# Patient Record
Sex: FEMALE | Race: WHITE | Employment: UNEMPLOYED | URBAN - METROPOLITAN AREA
[De-identification: names, ages, dates, MRNs, and addresses within clinical notes are randomized per-mention and may not be internally consistent; named-entity substitution may affect disease eponyms.]

---

## 2017-04-11 ENCOUNTER — OFFICE VISIT (OUTPATIENT)
Dept: FAMILY MEDICINE CLINIC | Age: 27
End: 2017-04-11

## 2017-04-11 VITALS
TEMPERATURE: 97.4 F | SYSTOLIC BLOOD PRESSURE: 136 MMHG | RESPIRATION RATE: 12 BRPM | HEART RATE: 72 BPM | HEIGHT: 71 IN | BODY MASS INDEX: 27.16 KG/M2 | WEIGHT: 194 LBS | DIASTOLIC BLOOD PRESSURE: 88 MMHG

## 2017-04-11 DIAGNOSIS — Z00.00 ROUTINE GENERAL MEDICAL EXAMINATION AT A HEALTH CARE FACILITY: ICD-10-CM

## 2017-04-11 DIAGNOSIS — F90.9 ATTENTION DEFICIT HYPERACTIVITY DISORDER (ADHD), UNSPECIFIED ADHD TYPE: ICD-10-CM

## 2017-04-11 DIAGNOSIS — Z23 NEED FOR TDAP VACCINATION: ICD-10-CM

## 2017-04-11 DIAGNOSIS — F41.9 ANXIETY: ICD-10-CM

## 2017-04-11 DIAGNOSIS — Z00.00 ROUTINE GENERAL MEDICAL EXAMINATION AT A HEALTH CARE FACILITY: Primary | ICD-10-CM

## 2017-04-11 DIAGNOSIS — F32.A DEPRESSION, UNSPECIFIED DEPRESSION TYPE: ICD-10-CM

## 2017-04-11 LAB
CHOLESTEROL, TOTAL: 230 MG/DL (ref 0–199)
HDLC SERPL-MCNC: 56 MG/DL (ref 40–59)
LDL CHOLESTEROL CALCULATED: 136 MG/DL (ref 0–129)
TRIGL SERPL-MCNC: 189 MG/DL (ref 0–200)

## 2017-04-11 PROCEDURE — 90471 IMMUNIZATION ADMIN: CPT | Performed by: FAMILY MEDICINE

## 2017-04-11 PROCEDURE — 90715 TDAP VACCINE 7 YRS/> IM: CPT | Performed by: FAMILY MEDICINE

## 2017-04-11 PROCEDURE — 99395 PREV VISIT EST AGE 18-39: CPT | Performed by: FAMILY MEDICINE

## 2017-04-11 RX ORDER — CLONAZEPAM 0.5 MG/1
0.5 TABLET ORAL 2 TIMES DAILY PRN
Qty: 60 TABLET | Refills: 2 | Status: SHIPPED | OUTPATIENT
Start: 2017-04-11 | End: 2017-06-27 | Stop reason: SDUPTHER

## 2017-04-11 ASSESSMENT — ENCOUNTER SYMPTOMS
DIARRHEA: 0
SORE THROAT: 0
EYE DISCHARGE: 0
COUGH: 0
CONSTIPATION: 0
SHORTNESS OF BREATH: 0
EYE ITCHING: 0
ABDOMINAL PAIN: 0
SINUS PRESSURE: 0

## 2017-06-27 ENCOUNTER — OFFICE VISIT (OUTPATIENT)
Dept: FAMILY MEDICINE CLINIC | Age: 27
End: 2017-06-27

## 2017-06-27 VITALS
SYSTOLIC BLOOD PRESSURE: 124 MMHG | HEART RATE: 72 BPM | BODY MASS INDEX: 26.23 KG/M2 | RESPIRATION RATE: 16 BRPM | WEIGHT: 187.4 LBS | HEIGHT: 71 IN | TEMPERATURE: 97.1 F | DIASTOLIC BLOOD PRESSURE: 88 MMHG

## 2017-06-27 DIAGNOSIS — F32.A DEPRESSION, UNSPECIFIED DEPRESSION TYPE: ICD-10-CM

## 2017-06-27 DIAGNOSIS — F41.9 ANXIETY: Primary | ICD-10-CM

## 2017-06-27 PROCEDURE — 99213 OFFICE O/P EST LOW 20 MIN: CPT | Performed by: FAMILY MEDICINE

## 2017-06-27 RX ORDER — SERTRALINE HYDROCHLORIDE 100 MG/1
100 TABLET, FILM COATED ORAL DAILY
Qty: 30 TABLET | Refills: 3 | Status: SHIPPED | OUTPATIENT
Start: 2017-06-27 | End: 2017-10-13 | Stop reason: SDUPTHER

## 2017-06-27 RX ORDER — CLONAZEPAM 0.5 MG/1
0.5 TABLET ORAL 3 TIMES DAILY PRN
Qty: 90 TABLET | Refills: 2 | Status: SHIPPED | OUTPATIENT
Start: 2017-06-27 | End: 2017-10-13 | Stop reason: SDUPTHER

## 2017-06-27 ASSESSMENT — ENCOUNTER SYMPTOMS
DIARRHEA: 0
SHORTNESS OF BREATH: 0
NAUSEA: 0
EYES NEGATIVE: 1
ABDOMINAL PAIN: 0
COUGH: 0
CONSTIPATION: 0

## 2017-10-13 ENCOUNTER — OFFICE VISIT (OUTPATIENT)
Dept: FAMILY MEDICINE CLINIC | Age: 27
End: 2017-10-13

## 2017-10-13 VITALS
RESPIRATION RATE: 16 BRPM | HEIGHT: 71 IN | SYSTOLIC BLOOD PRESSURE: 106 MMHG | WEIGHT: 190.2 LBS | TEMPERATURE: 96.8 F | DIASTOLIC BLOOD PRESSURE: 64 MMHG | HEART RATE: 72 BPM | BODY MASS INDEX: 26.63 KG/M2

## 2017-10-13 DIAGNOSIS — F90.9 ATTENTION DEFICIT HYPERACTIVITY DISORDER (ADHD), UNSPECIFIED ADHD TYPE: ICD-10-CM

## 2017-10-13 DIAGNOSIS — F41.9 ANXIETY: ICD-10-CM

## 2017-10-13 DIAGNOSIS — F32.A DEPRESSION, UNSPECIFIED DEPRESSION TYPE: Primary | ICD-10-CM

## 2017-10-13 PROCEDURE — 99213 OFFICE O/P EST LOW 20 MIN: CPT | Performed by: FAMILY MEDICINE

## 2017-10-13 RX ORDER — CLONAZEPAM 0.5 MG/1
0.5 TABLET ORAL 3 TIMES DAILY PRN
Qty: 90 TABLET | Refills: 4 | Status: SHIPPED | OUTPATIENT
Start: 2017-10-13 | End: 2018-03-02 | Stop reason: SDUPTHER

## 2017-10-13 RX ORDER — SERTRALINE HYDROCHLORIDE 100 MG/1
100 TABLET, FILM COATED ORAL DAILY
Qty: 30 TABLET | Refills: 5 | Status: SHIPPED | OUTPATIENT
Start: 2017-10-13 | End: 2018-03-02 | Stop reason: SDUPTHER

## 2017-10-13 ASSESSMENT — ENCOUNTER SYMPTOMS
ABDOMINAL PAIN: 0
NAUSEA: 0
COUGH: 0
DIARRHEA: 0
EYES NEGATIVE: 1
CONSTIPATION: 0
SHORTNESS OF BREATH: 0

## 2017-10-13 NOTE — PROGRESS NOTES
Illness Mother      bipolar / anxiety     No Known Allergies  Current Outpatient Prescriptions   Medication Sig Dispense Refill    clonazePAM (KLONOPIN) 0.5 MG tablet Take 1 tablet by mouth 3 times daily as needed for Anxiety 90 tablet 4    sertraline (ZOLOFT) 100 MG tablet Take 1 tablet by mouth daily 30 tablet 5     No current facility-administered medications for this visit. PMH, Surgical Hx, Family Hx, and Social Hx reviewed and updated. Health Maintenance reviewed. Objective    Vitals:    10/13/17 0935   BP: 106/64   Pulse: 72   Resp: 16   Temp: 96.8 °F (36 °C)   TempSrc: Temporal   Weight: 190 lb 3.2 oz (86.3 kg)   Height: 5' 10.5\" (1.791 m)       Physical Exam   HENT:   Nose: Nose normal.   Mouth/Throat: Oropharynx is clear and moist.   Eyes: Conjunctivae are normal. Pupils are equal, round, and reactive to light. Neck: Neck supple. No thyromegaly present. Cardiovascular: Normal rate, regular rhythm and normal heart sounds. No murmur heard. Pulmonary/Chest: Breath sounds normal. She has no wheezes. Abdominal: Soft. She exhibits no mass. There is no tenderness. Musculoskeletal: She exhibits no edema. Lymphadenopathy:     She has no cervical adenopathy. Neurological: She is alert. Psychiatric: She has a normal mood and affect. Her behavior is normal.       Assessment & Plan   1. Depression, unspecified depression type  sertraline (ZOLOFT) 100 MG tablet   2. Anxiety  clonazePAM (KLONOPIN) 0.5 MG tablet    sertraline (ZOLOFT) 100 MG tablet   3. Attention deficit hyperactivity disorder (ADHD), unspecified ADHD type       No orders of the defined types were placed in this encounter.     Orders Placed This Encounter   Medications    clonazePAM (KLONOPIN) 0.5 MG tablet     Sig: Take 1 tablet by mouth 3 times daily as needed for Anxiety     Dispense:  90 tablet     Refill:  4    sertraline (ZOLOFT) 100 MG tablet     Sig: Take 1 tablet by mouth daily     Dispense:  30 tablet     Refill:  5

## 2017-10-13 NOTE — PATIENT INSTRUCTIONS

## 2018-03-02 ENCOUNTER — OFFICE VISIT (OUTPATIENT)
Dept: FAMILY MEDICINE CLINIC | Age: 28
End: 2018-03-02
Payer: COMMERCIAL

## 2018-03-02 VITALS
WEIGHT: 203.4 LBS | HEART RATE: 98 BPM | SYSTOLIC BLOOD PRESSURE: 128 MMHG | RESPIRATION RATE: 20 BRPM | DIASTOLIC BLOOD PRESSURE: 76 MMHG | BODY MASS INDEX: 28.48 KG/M2 | TEMPERATURE: 98.7 F | HEIGHT: 71 IN

## 2018-03-02 DIAGNOSIS — F41.9 ANXIETY: ICD-10-CM

## 2018-03-02 DIAGNOSIS — F32.A DEPRESSION, UNSPECIFIED DEPRESSION TYPE: ICD-10-CM

## 2018-03-02 PROCEDURE — 99213 OFFICE O/P EST LOW 20 MIN: CPT | Performed by: FAMILY MEDICINE

## 2018-03-02 RX ORDER — SERTRALINE HYDROCHLORIDE 100 MG/1
100 TABLET, FILM COATED ORAL DAILY
Qty: 30 TABLET | Refills: 5 | Status: SHIPPED | OUTPATIENT
Start: 2018-03-02 | End: 2018-08-21 | Stop reason: ALTCHOICE

## 2018-03-02 RX ORDER — BUPROPION HYDROCHLORIDE 150 MG/1
150 TABLET ORAL EVERY MORNING
Qty: 30 TABLET | Refills: 3 | Status: SHIPPED | OUTPATIENT
Start: 2018-03-02 | End: 2018-04-03 | Stop reason: SDUPTHER

## 2018-03-02 RX ORDER — CLONAZEPAM 0.5 MG/1
TABLET ORAL
Qty: 120 TABLET | Refills: 0 | Status: SHIPPED | OUTPATIENT
Start: 2018-03-02 | End: 2018-08-21 | Stop reason: ALTCHOICE

## 2018-03-02 ASSESSMENT — ENCOUNTER SYMPTOMS
DIARRHEA: 0
NAUSEA: 0
CONSTIPATION: 0
EYES NEGATIVE: 1
SHORTNESS OF BREATH: 0
ABDOMINAL PAIN: 0
COUGH: 0

## 2018-03-02 ASSESSMENT — PATIENT HEALTH QUESTIONNAIRE - PHQ9
1. LITTLE INTEREST OR PLEASURE IN DOING THINGS: 0
SUM OF ALL RESPONSES TO PHQ QUESTIONS 1-9: 0
SUM OF ALL RESPONSES TO PHQ9 QUESTIONS 1 & 2: 0
2. FEELING DOWN, DEPRESSED OR HOPELESS: 0

## 2018-03-02 NOTE — PROGRESS NOTES
Subjective  Rice Fossa, 32 y.o. female presents today with:  Chief Complaint   Patient presents with    Anxiety         Depression                HPI  patient is present today to follow up on Klonopin 0.5 mg. 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 follow up on Zoloft 100 mg. this is working well for her. Taking current medications which were reviewed. Problem list discussed. Eating okay. Overall doing well. Has no other new problem / question. Health Maintenance Is Up-To-Date    No other questions and or concerns for today's visit      Review of Systems   Constitutional: Negative for activity change, appetite change, fatigue and fever. Eyes: Negative. Respiratory: Negative for cough and shortness of breath. Cardiovascular: Negative for chest pain and palpitations. Gastrointestinal: Negative for abdominal pain, constipation, diarrhea and nausea. Genitourinary: Negative for dysuria and frequency. Neurological: Negative for dizziness and headaches. Psychiatric/Behavioral: Negative for self-injury and suicidal ideas. History reviewed. No pertinent past medical history. History reviewed. No pertinent surgical history.   Social History     Social History    Marital status:      Spouse name: Janes Ortega    Number of children: 3    Years of education: N/A     Occupational History     Other     Social History Main Topics    Smoking status: Current Every Day Smoker     Packs/day: 0.25     Years: 8.00     Types: Cigarettes     Start date: 1/1/2009    Smokeless tobacco: Never Used      Comment: 1 pack per week    Alcohol use 0.6 oz/week     1 Glasses of wine per week    Drug use: No    Sexual activity: Yes     Partners: Male     Other Topics Concern    Not on file     Social History Narrative    No narrative on file     Family History   Problem Relation Age of Onset    Mental Refill:  5    buPROPion (WELLBUTRIN XL) 150 MG extended release tablet     Sig: Take 1 tablet by mouth every morning     Dispense:  30 tablet     Refill:  3     Medications Discontinued During This Encounter   Medication Reason    clonazePAM (KLONOPIN) 0.5 MG tablet Reorder    sertraline (ZOLOFT) 100 MG tablet Reorder     No Follow-up on file. Long talk. Benjalba Lies Options reviewed including seeing a therapist.  He does not feel he has the time do that  now  The importance of a good diet and exercise regimen discussed. Take medications as prescribed. We'll increase Klonopin just for this month. She'll try to exercise a little more and eat healthy and get enough sleep  Follow up as instructed. Call if any problems        Controlled Substances Monitoring: The Prescription Monitoring Report for this patient was reviewed today. Hellen Jaime MD)        Hellen Jaime MD          Please note this report has been partially produced using speech recognition software  And may cause contain errors related to that system including grammar, punctuation and spelling as well as words and phrases that may seem inappropriate. If there are questions or concerns please feel free to contact me to clarify.

## 2018-04-03 ENCOUNTER — OFFICE VISIT (OUTPATIENT)
Dept: FAMILY MEDICINE CLINIC | Age: 28
End: 2018-04-03
Payer: COMMERCIAL

## 2018-04-03 VITALS
SYSTOLIC BLOOD PRESSURE: 120 MMHG | DIASTOLIC BLOOD PRESSURE: 78 MMHG | RESPIRATION RATE: 12 BRPM | HEIGHT: 71 IN | BODY MASS INDEX: 28.42 KG/M2 | WEIGHT: 203 LBS | HEART RATE: 60 BPM | TEMPERATURE: 96.7 F

## 2018-04-03 DIAGNOSIS — F32.A DEPRESSION, UNSPECIFIED DEPRESSION TYPE: ICD-10-CM

## 2018-04-03 DIAGNOSIS — F41.9 ANXIETY: Primary | ICD-10-CM

## 2018-04-03 DIAGNOSIS — F90.9 ATTENTION DEFICIT HYPERACTIVITY DISORDER (ADHD), UNSPECIFIED ADHD TYPE: ICD-10-CM

## 2018-04-03 PROCEDURE — G8419 CALC BMI OUT NRM PARAM NOF/U: HCPCS | Performed by: FAMILY MEDICINE

## 2018-04-03 PROCEDURE — G8427 DOCREV CUR MEDS BY ELIG CLIN: HCPCS | Performed by: FAMILY MEDICINE

## 2018-04-03 PROCEDURE — 99213 OFFICE O/P EST LOW 20 MIN: CPT | Performed by: FAMILY MEDICINE

## 2018-04-03 PROCEDURE — 4004F PT TOBACCO SCREEN RCVD TLK: CPT | Performed by: FAMILY MEDICINE

## 2018-04-03 RX ORDER — CLONAZEPAM 0.5 MG/1
0.5 TABLET ORAL 3 TIMES DAILY PRN
Qty: 90 TABLET | Refills: 2 | Status: SHIPPED | OUTPATIENT
Start: 2018-04-03 | End: 2018-08-21 | Stop reason: ALTCHOICE

## 2018-04-03 RX ORDER — BUPROPION HYDROCHLORIDE 150 MG/1
150 TABLET ORAL EVERY MORNING
Qty: 30 TABLET | Refills: 4 | Status: SHIPPED | OUTPATIENT
Start: 2018-04-03 | End: 2018-08-21 | Stop reason: ALTCHOICE

## 2018-04-03 RX ORDER — CLONAZEPAM 0.5 MG/1
0.5 TABLET ORAL 3 TIMES DAILY PRN
Qty: 90 TABLET | Refills: 2 | Status: SHIPPED | OUTPATIENT
Start: 2018-04-03 | End: 2018-04-03 | Stop reason: SDUPTHER

## 2018-04-03 ASSESSMENT — ENCOUNTER SYMPTOMS
EYES NEGATIVE: 1
NAUSEA: 0
DIARRHEA: 0
ABDOMINAL PAIN: 0
CONSTIPATION: 0
SHORTNESS OF BREATH: 0
COUGH: 0

## 2018-08-21 ENCOUNTER — OFFICE VISIT (OUTPATIENT)
Dept: FAMILY MEDICINE CLINIC | Age: 28
End: 2018-08-21
Payer: COMMERCIAL

## 2018-08-21 VITALS
HEIGHT: 71 IN | HEART RATE: 70 BPM | BODY MASS INDEX: 27.5 KG/M2 | TEMPERATURE: 98 F | SYSTOLIC BLOOD PRESSURE: 116 MMHG | WEIGHT: 196.4 LBS | RESPIRATION RATE: 20 BRPM | DIASTOLIC BLOOD PRESSURE: 68 MMHG

## 2018-08-21 DIAGNOSIS — L98.9 SKIN LESION OF LEFT LEG: Primary | ICD-10-CM

## 2018-08-21 DIAGNOSIS — L98.9 SKIN LESION OF FACE: ICD-10-CM

## 2018-08-21 PROCEDURE — 4004F PT TOBACCO SCREEN RCVD TLK: CPT | Performed by: FAMILY MEDICINE

## 2018-08-21 PROCEDURE — G8427 DOCREV CUR MEDS BY ELIG CLIN: HCPCS | Performed by: FAMILY MEDICINE

## 2018-08-21 PROCEDURE — 99213 OFFICE O/P EST LOW 20 MIN: CPT | Performed by: FAMILY MEDICINE

## 2018-08-21 PROCEDURE — G8419 CALC BMI OUT NRM PARAM NOF/U: HCPCS | Performed by: FAMILY MEDICINE

## 2018-08-21 RX ORDER — SULFAMETHOXAZOLE AND TRIMETHOPRIM 800; 160 MG/1; MG/1
1 TABLET ORAL 2 TIMES DAILY
Qty: 20 TABLET | Refills: 0 | Status: SHIPPED | OUTPATIENT
Start: 2018-08-21 | End: 2018-08-31

## 2018-08-21 ASSESSMENT — ENCOUNTER SYMPTOMS
SORE THROAT: 1
NAUSEA: 0
EYES NEGATIVE: 1
DIARRHEA: 0
CONSTIPATION: 0
ABDOMINAL PAIN: 0
COUGH: 0
SHORTNESS OF BREATH: 0

## 2018-08-21 NOTE — PROGRESS NOTES
pulses. Exam reveals no gallop and no friction rub. No murmur heard. Pulmonary/Chest: Effort normal and breath sounds normal.   Abdominal: Soft. Bowel sounds are normal.   Musculoskeletal: Normal range of motion. Neurological: She is alert and oriented to person, place, and time. Skin: Skin is warm and dry. Psychiatric: She has a normal mood and affect. Her behavior is normal.       A. recurrent lesions are dry and crusting now there is no way to get a culture denies fever or chills her son has similar appearing lesions she's taking him to the pediatrician this afternoon we'll start her on Bactrim DS twice a day for 10 days if she is not improving over the next 2-3 days or seems to be worsening or new symptoms develop she will let us know. Plan    Diagnosis Orders   1. Skin lesion of left leg     2. Skin lesion of face       No orders of the defined types were placed in this encounter. Orders Placed This Encounter   Medications    sulfamethoxazole-trimethoprim (BACTRIM DS) 800-160 MG per tablet     Sig: Take 1 tablet by mouth 2 times daily for 10 days     Dispense:  20 tablet     Refill:  0     Medications Discontinued During This Encounter   Medication Reason    clonazePAM (KLONOPIN) 0.5 MG tablet Therapy completed    buPROPion (WELLBUTRIN XL) 150 MG extended release tablet Therapy completed    clonazePAM (KLONOPIN) 0.5 MG tablet Therapy completed    sertraline (ZOLOFT) 100 MG tablet Therapy completed     No Follow-up on file. Controlled Substances Monitoring:     RX Monitoring 3/3/2018   Attestation The Prescription Monitoring Report for this patient was reviewed today. Documentation No signs of potential drug abuse or diversion identified. ;Possible medication side effects, risk of tolerance/dependence & alternative treatments discussed.        Norbert Lundberg MD

## 2018-09-21 ENCOUNTER — OFFICE VISIT (OUTPATIENT)
Dept: FAMILY MEDICINE CLINIC | Age: 28
End: 2018-09-21
Payer: COMMERCIAL

## 2018-09-21 VITALS
DIASTOLIC BLOOD PRESSURE: 80 MMHG | TEMPERATURE: 97.6 F | HEIGHT: 71 IN | WEIGHT: 201.4 LBS | BODY MASS INDEX: 28.19 KG/M2 | RESPIRATION RATE: 16 BRPM | HEART RATE: 88 BPM | SYSTOLIC BLOOD PRESSURE: 122 MMHG

## 2018-09-21 DIAGNOSIS — F41.9 ANXIETY: Primary | ICD-10-CM

## 2018-09-21 DIAGNOSIS — F32.A DEPRESSION, UNSPECIFIED DEPRESSION TYPE: ICD-10-CM

## 2018-09-21 PROCEDURE — G8419 CALC BMI OUT NRM PARAM NOF/U: HCPCS | Performed by: FAMILY MEDICINE

## 2018-09-21 PROCEDURE — G8427 DOCREV CUR MEDS BY ELIG CLIN: HCPCS | Performed by: FAMILY MEDICINE

## 2018-09-21 PROCEDURE — 99213 OFFICE O/P EST LOW 20 MIN: CPT | Performed by: FAMILY MEDICINE

## 2018-09-21 PROCEDURE — 4004F PT TOBACCO SCREEN RCVD TLK: CPT | Performed by: FAMILY MEDICINE

## 2018-09-21 RX ORDER — BUPROPION HYDROCHLORIDE 150 MG/1
150 TABLET ORAL EVERY MORNING
Qty: 30 TABLET | Refills: 3 | Status: SHIPPED | OUTPATIENT
Start: 2018-09-21 | End: 2019-01-16 | Stop reason: ALTCHOICE

## 2018-09-21 RX ORDER — SERTRALINE HYDROCHLORIDE 100 MG/1
TABLET, FILM COATED ORAL
Qty: 30 TABLET | Refills: 5 | Status: SHIPPED | OUTPATIENT
Start: 2018-09-21 | End: 2019-01-07 | Stop reason: SDUPTHER

## 2018-09-21 RX ORDER — CLONAZEPAM 0.5 MG/1
0.5 TABLET ORAL 2 TIMES DAILY PRN
Qty: 50 TABLET | Refills: 1 | Status: SHIPPED | OUTPATIENT
Start: 2018-09-21 | End: 2018-11-16 | Stop reason: SDUPTHER

## 2018-09-21 ASSESSMENT — ENCOUNTER SYMPTOMS
ABDOMINAL PAIN: 0
COUGH: 0
SHORTNESS OF BREATH: 0
NAUSEA: 0
DIARRHEA: 0
CONSTIPATION: 0
EYES NEGATIVE: 1

## 2018-09-21 NOTE — PROGRESS NOTES
Family History   Problem Relation Age of Onset    Mental Illness Mother         bipolar / anxiety     No Known Allergies  Current Outpatient Prescriptions   Medication Sig Dispense Refill    clonazePAM (KLONOPIN) 0.5 MG tablet Take 1 tablet by mouth 2 times daily as needed for Anxiety for up to 30 days. . 50 tablet 1    buPROPion (WELLBUTRIN XL) 150 MG extended release tablet Take 1 tablet by mouth every morning 30 tablet 3    sertraline (ZOLOFT) 100 MG tablet 1/2 -1 PO DAILY 30 tablet 5     No current facility-administered medications for this visit. PMH, Surgical Hx, Family Hx, and Social Hx reviewed and updated. Health Maintenance reviewed. Objective    Vitals:    09/21/18 1312   BP: 122/80   Pulse: 88   Resp: 16   Temp: 97.6 °F (36.4 °C)   TempSrc: Temporal   Weight: 201 lb 6.4 oz (91.4 kg)   Height: 5' 10.5\" (1.791 m)       Physical Exam   HENT:   Nose: Nose normal.   Mouth/Throat: Oropharynx is clear and moist.   Eyes: Pupils are equal, round, and reactive to light. Conjunctivae are normal.   Neck: Neck supple. No thyromegaly present. Cardiovascular: Normal rate, regular rhythm and normal heart sounds. No murmur heard. Pulmonary/Chest: Breath sounds normal. She has no wheezes. Abdominal: Soft. She exhibits no mass. There is no tenderness. Musculoskeletal: She exhibits no edema. Lymphadenopathy:     She has no cervical adenopathy. Neurological: She is alert. Psychiatric: She has a normal mood and affect. Her behavior is normal.       Assessment & Plan    Diagnosis Orders   1. Anxiety  clonazePAM (KLONOPIN) 0.5 MG tablet    buPROPion (WELLBUTRIN XL) 150 MG extended release tablet    sertraline (ZOLOFT) 100 MG tablet   2. Depression, unspecified depression type  buPROPion (WELLBUTRIN XL) 150 MG extended release tablet    sertraline (ZOLOFT) 100 MG tablet     No orders of the defined types were placed in this encounter.     Orders Placed This Encounter   Medications    clonazePAM (KLONOPIN) 0.5 MG tablet     Sig: Take 1 tablet by mouth 2 times daily as needed for Anxiety for up to 30 days. .     Dispense:  50 tablet     Refill:  1    buPROPion (WELLBUTRIN XL) 150 MG extended release tablet     Sig: Take 1 tablet by mouth every morning     Dispense:  30 tablet     Refill:  3    sertraline (ZOLOFT) 100 MG tablet     Si/2 -1 PO DAILY     Dispense:  30 tablet     Refill:  5     There are no discontinued medications. No Follow-up on file. Long talk. Health maintenance issues reviewed and discussed with recommendations made. The importance of a good diet and exercise regimen discussed. Take medications as prescribed. We'll resume Zoloft at one half pill daily and use Klonopin when necessary  If does not feel medications working adequately after a week or 2 she will resume Wellbutrin as well  See her back in 1 month  Follow up as instructed. Call if any problems             George Peter MD          Please note this report has been partially produced using speech recognition software  And may cause contain errors related to that system including grammar, punctuation and spelling as well as words and phrases that may seem inappropriate. If there are questions or concerns please feel free to contact me to clarify.

## 2018-11-16 DIAGNOSIS — F41.9 ANXIETY: ICD-10-CM

## 2018-11-16 RX ORDER — CLONAZEPAM 0.5 MG/1
0.5 TABLET ORAL 2 TIMES DAILY PRN
Qty: 50 TABLET | Refills: 0 | OUTPATIENT
Start: 2018-11-16 | End: 2018-12-17 | Stop reason: SDUPTHER

## 2018-12-06 ENCOUNTER — OFFICE VISIT (OUTPATIENT)
Dept: FAMILY MEDICINE CLINIC | Age: 28
End: 2018-12-06
Payer: COMMERCIAL

## 2018-12-06 VITALS
BODY MASS INDEX: 29.51 KG/M2 | HEART RATE: 80 BPM | DIASTOLIC BLOOD PRESSURE: 82 MMHG | SYSTOLIC BLOOD PRESSURE: 116 MMHG | WEIGHT: 199.2 LBS | HEIGHT: 69 IN | RESPIRATION RATE: 20 BRPM | TEMPERATURE: 98.2 F

## 2018-12-06 DIAGNOSIS — Z00.00 ROUTINE GENERAL MEDICAL EXAMINATION AT A HEALTH CARE FACILITY: Primary | ICD-10-CM

## 2018-12-06 DIAGNOSIS — F32.A DEPRESSION, UNSPECIFIED DEPRESSION TYPE: ICD-10-CM

## 2018-12-06 DIAGNOSIS — R53.83 FATIGUE, UNSPECIFIED TYPE: ICD-10-CM

## 2018-12-06 DIAGNOSIS — F41.9 ANXIETY: ICD-10-CM

## 2018-12-06 DIAGNOSIS — Z00.00 ROUTINE GENERAL MEDICAL EXAMINATION AT A HEALTH CARE FACILITY: ICD-10-CM

## 2018-12-06 DIAGNOSIS — F90.9 ATTENTION DEFICIT HYPERACTIVITY DISORDER (ADHD), UNSPECIFIED ADHD TYPE: ICD-10-CM

## 2018-12-06 LAB
ANION GAP SERPL CALCULATED.3IONS-SCNC: 12 MEQ/L (ref 7–13)
BASOPHILS ABSOLUTE: 0.1 K/UL (ref 0–0.2)
BASOPHILS RELATIVE PERCENT: 0.5 %
BUN BLDV-MCNC: 9 MG/DL (ref 6–20)
CALCIUM SERPL-MCNC: 9.8 MG/DL (ref 8.6–10.2)
CHLORIDE BLD-SCNC: 101 MEQ/L (ref 98–107)
CHOLESTEROL, TOTAL: 156 MG/DL (ref 0–199)
CO2: 25 MEQ/L (ref 22–29)
CREAT SERPL-MCNC: 0.71 MG/DL (ref 0.5–0.9)
EOSINOPHILS ABSOLUTE: 0.4 K/UL (ref 0–0.7)
EOSINOPHILS RELATIVE PERCENT: 3 %
GFR AFRICAN AMERICAN: >60
GFR NON-AFRICAN AMERICAN: >60
GLUCOSE BLD-MCNC: 83 MG/DL (ref 74–109)
HCT VFR BLD CALC: 42.6 % (ref 37–47)
HDLC SERPL-MCNC: 45 MG/DL (ref 40–59)
HEMOGLOBIN: 14.4 G/DL (ref 12–16)
LDL CHOLESTEROL CALCULATED: 97 MG/DL (ref 0–129)
LYMPHOCYTES ABSOLUTE: 2.2 K/UL (ref 1–4.8)
LYMPHOCYTES RELATIVE PERCENT: 18.1 %
MCH RBC QN AUTO: 29.7 PG (ref 27–31.3)
MCHC RBC AUTO-ENTMCNC: 33.8 % (ref 33–37)
MCV RBC AUTO: 88 FL (ref 82–100)
MONOCYTES ABSOLUTE: 0.9 K/UL (ref 0.2–0.8)
MONOCYTES RELATIVE PERCENT: 7.5 %
NEUTROPHILS ABSOLUTE: 8.5 K/UL (ref 1.4–6.5)
NEUTROPHILS RELATIVE PERCENT: 70.9 %
PDW BLD-RTO: 13.8 % (ref 11.5–14.5)
PLATELET # BLD: 319 K/UL (ref 130–400)
POTASSIUM SERPL-SCNC: 4.3 MEQ/L (ref 3.5–5.1)
RBC # BLD: 4.83 M/UL (ref 4.2–5.4)
SODIUM BLD-SCNC: 138 MEQ/L (ref 132–144)
TRIGL SERPL-MCNC: 71 MG/DL (ref 0–200)
TSH SERPL DL<=0.05 MIU/L-ACNC: 1.28 UIU/ML (ref 0.27–4.2)
WBC # BLD: 11.9 K/UL (ref 4.8–10.8)

## 2018-12-06 PROCEDURE — 99395 PREV VISIT EST AGE 18-39: CPT | Performed by: FAMILY MEDICINE

## 2018-12-06 PROCEDURE — G8484 FLU IMMUNIZE NO ADMIN: HCPCS | Performed by: FAMILY MEDICINE

## 2018-12-06 RX ORDER — DEXTROAMPHETAMINE SACCHARATE, AMPHETAMINE ASPARTATE, DEXTROAMPHETAMINE SULFATE AND AMPHETAMINE SULFATE 2.5; 2.5; 2.5; 2.5 MG/1; MG/1; MG/1; MG/1
10 TABLET ORAL 2 TIMES DAILY
Qty: 60 TABLET | Refills: 0 | Status: SHIPPED | OUTPATIENT
Start: 2018-12-06 | End: 2019-01-07 | Stop reason: SDUPTHER

## 2018-12-06 ASSESSMENT — ENCOUNTER SYMPTOMS
SORE THROAT: 0
EYE DISCHARGE: 0
EYE ITCHING: 0
SHORTNESS OF BREATH: 0
CHEST TIGHTNESS: 0
CONSTIPATION: 0
DIARRHEA: 0
SINUS PRESSURE: 0
ABDOMINAL PAIN: 0

## 2018-12-06 NOTE — PROGRESS NOTES
health care facility  CBC With Auto Differential    Lipid Panel    Basic Metabolic Panel   2. Attention deficit hyperactivity disorder (ADHD), unspecified ADHD type  amphetamine-dextroamphetamine (ADDERALL, 10MG,) 10 MG tablet   3. Anxiety     4. Depression, unspecified depression type     5. Fatigue, unspecified type  CBC With Auto Differential    TSH without Reflex     Orders Placed This Encounter   Procedures    CBC With Auto Differential     Standing Status:   Future     Number of Occurrences:   1     Standing Expiration Date:   12/6/2019    Lipid Panel     Standing Status:   Future     Number of Occurrences:   1     Standing Expiration Date:   12/6/2019     Order Specific Question:   Is Patient Fasting?/# of Hours     Answer:   yes    Basic Metabolic Panel     Standing Status:   Future     Number of Occurrences:   1     Standing Expiration Date:   12/6/2019    TSH without Reflex     Standing Status:   Future     Number of Occurrences:   1     Standing Expiration Date:   12/6/2019     Orders Placed This Encounter   Medications    amphetamine-dextroamphetamine (ADDERALL, 10MG,) 10 MG tablet     Sig: Take 1 tablet by mouth 2 times daily for 30 days. .     Dispense:  60 tablet     Refill:  0     There are no discontinued medications. No Follow-up on file. Long talk. Health maintenance issues reviewed and discussed with recommendations made. The importance of a good diet and exercise regimen discussed. Take medications as prescribed. Follow up as instructed. Call if any problems           Opal Cope MD          Please note this report has been partiallyproduced using speech recognition software  And may cause contain errors related to that system including grammar, punctuation and spelling as well as words and phrases that may seem inappropriate. If there are questionsor concerns please feel free to contact me to clarify.

## 2018-12-17 DIAGNOSIS — F41.9 ANXIETY: ICD-10-CM

## 2018-12-17 RX ORDER — CLONAZEPAM 0.5 MG/1
0.5 TABLET ORAL 2 TIMES DAILY PRN
Qty: 50 TABLET | Refills: 2 | OUTPATIENT
Start: 2018-12-17 | End: 2019-03-27 | Stop reason: SDUPTHER

## 2019-01-07 DIAGNOSIS — F41.9 ANXIETY: ICD-10-CM

## 2019-01-07 DIAGNOSIS — F32.A DEPRESSION, UNSPECIFIED DEPRESSION TYPE: ICD-10-CM

## 2019-01-07 DIAGNOSIS — F90.9 ATTENTION DEFICIT HYPERACTIVITY DISORDER (ADHD), UNSPECIFIED ADHD TYPE: ICD-10-CM

## 2019-01-07 RX ORDER — SERTRALINE HYDROCHLORIDE 100 MG/1
TABLET, FILM COATED ORAL
Qty: 30 TABLET | Refills: 5 | Status: SHIPPED | OUTPATIENT
Start: 2019-01-07 | End: 2019-11-01

## 2019-01-07 RX ORDER — DEXTROAMPHETAMINE SACCHARATE, AMPHETAMINE ASPARTATE, DEXTROAMPHETAMINE SULFATE AND AMPHETAMINE SULFATE 2.5; 2.5; 2.5; 2.5 MG/1; MG/1; MG/1; MG/1
10 TABLET ORAL 2 TIMES DAILY
Qty: 60 TABLET | Refills: 0 | Status: SHIPPED | OUTPATIENT
Start: 2019-01-07 | End: 2019-02-07 | Stop reason: SDUPTHER

## 2019-01-16 ENCOUNTER — OFFICE VISIT (OUTPATIENT)
Dept: FAMILY MEDICINE CLINIC | Age: 29
End: 2019-01-16
Payer: COMMERCIAL

## 2019-01-16 VITALS
TEMPERATURE: 97.3 F | SYSTOLIC BLOOD PRESSURE: 118 MMHG | BODY MASS INDEX: 26.46 KG/M2 | WEIGHT: 189 LBS | HEART RATE: 95 BPM | DIASTOLIC BLOOD PRESSURE: 94 MMHG | HEIGHT: 71 IN

## 2019-01-16 DIAGNOSIS — J02.8 ACUTE PHARYNGITIS DUE TO OTHER SPECIFIED ORGANISMS: Primary | ICD-10-CM

## 2019-01-16 DIAGNOSIS — B34.9 VIRAL SYNDROME: ICD-10-CM

## 2019-01-16 PROCEDURE — G8484 FLU IMMUNIZE NO ADMIN: HCPCS | Performed by: FAMILY MEDICINE

## 2019-01-16 PROCEDURE — G8427 DOCREV CUR MEDS BY ELIG CLIN: HCPCS | Performed by: FAMILY MEDICINE

## 2019-01-16 PROCEDURE — 4004F PT TOBACCO SCREEN RCVD TLK: CPT | Performed by: FAMILY MEDICINE

## 2019-01-16 PROCEDURE — G8419 CALC BMI OUT NRM PARAM NOF/U: HCPCS | Performed by: FAMILY MEDICINE

## 2019-01-16 PROCEDURE — 99213 OFFICE O/P EST LOW 20 MIN: CPT | Performed by: FAMILY MEDICINE

## 2019-01-16 RX ORDER — METHYLPREDNISOLONE 4 MG/1
TABLET ORAL
Qty: 1 KIT | Refills: 0 | Status: SHIPPED | OUTPATIENT
Start: 2019-01-16 | End: 2019-11-01

## 2019-01-16 ASSESSMENT — ENCOUNTER SYMPTOMS
EYE ITCHING: 0
SINUS PRESSURE: 0
SHORTNESS OF BREATH: 0
SORE THROAT: 1
DIARRHEA: 0
ABDOMINAL PAIN: 0
EYE DISCHARGE: 0
CONSTIPATION: 0
COUGH: 0

## 2019-01-16 ASSESSMENT — PATIENT HEALTH QUESTIONNAIRE - PHQ9
1. LITTLE INTEREST OR PLEASURE IN DOING THINGS: 0
2. FEELING DOWN, DEPRESSED OR HOPELESS: 0
SUM OF ALL RESPONSES TO PHQ9 QUESTIONS 1 & 2: 0
SUM OF ALL RESPONSES TO PHQ QUESTIONS 1-9: 0
SUM OF ALL RESPONSES TO PHQ QUESTIONS 1-9: 0

## 2019-01-18 ENCOUNTER — OFFICE VISIT (OUTPATIENT)
Dept: FAMILY MEDICINE CLINIC | Age: 29
End: 2019-01-18
Payer: COMMERCIAL

## 2019-01-18 VITALS
DIASTOLIC BLOOD PRESSURE: 86 MMHG | BODY MASS INDEX: 26.82 KG/M2 | HEART RATE: 60 BPM | SYSTOLIC BLOOD PRESSURE: 124 MMHG | RESPIRATION RATE: 12 BRPM | TEMPERATURE: 97.1 F | WEIGHT: 191.6 LBS | HEIGHT: 71 IN

## 2019-01-18 DIAGNOSIS — J02.9 SORE THROAT: Primary | ICD-10-CM

## 2019-01-18 DIAGNOSIS — B96.89 ACUTE BACTERIAL SINUSITIS: ICD-10-CM

## 2019-01-18 DIAGNOSIS — J01.90 ACUTE BACTERIAL SINUSITIS: ICD-10-CM

## 2019-01-18 PROCEDURE — G8419 CALC BMI OUT NRM PARAM NOF/U: HCPCS | Performed by: FAMILY MEDICINE

## 2019-01-18 PROCEDURE — G8427 DOCREV CUR MEDS BY ELIG CLIN: HCPCS | Performed by: FAMILY MEDICINE

## 2019-01-18 PROCEDURE — 4004F PT TOBACCO SCREEN RCVD TLK: CPT | Performed by: FAMILY MEDICINE

## 2019-01-18 PROCEDURE — G8484 FLU IMMUNIZE NO ADMIN: HCPCS | Performed by: FAMILY MEDICINE

## 2019-01-18 PROCEDURE — 99213 OFFICE O/P EST LOW 20 MIN: CPT | Performed by: FAMILY MEDICINE

## 2019-01-18 ASSESSMENT — ENCOUNTER SYMPTOMS
SHORTNESS OF BREATH: 0
EYES NEGATIVE: 1
CONSTIPATION: 0
COUGH: 0
NAUSEA: 0
ABDOMINAL PAIN: 0
SORE THROAT: 1
TROUBLE SWALLOWING: 1
DIARRHEA: 0

## 2019-02-07 DIAGNOSIS — F90.9 ATTENTION DEFICIT HYPERACTIVITY DISORDER (ADHD), UNSPECIFIED ADHD TYPE: ICD-10-CM

## 2019-02-07 RX ORDER — DEXTROAMPHETAMINE SACCHARATE, AMPHETAMINE ASPARTATE, DEXTROAMPHETAMINE SULFATE AND AMPHETAMINE SULFATE 2.5; 2.5; 2.5; 2.5 MG/1; MG/1; MG/1; MG/1
10 TABLET ORAL 2 TIMES DAILY
Qty: 60 TABLET | Refills: 0 | Status: SHIPPED | OUTPATIENT
Start: 2019-02-07 | End: 2019-03-27 | Stop reason: SDUPTHER

## 2019-03-27 DIAGNOSIS — F90.9 ATTENTION DEFICIT HYPERACTIVITY DISORDER (ADHD), UNSPECIFIED ADHD TYPE: ICD-10-CM

## 2019-03-27 DIAGNOSIS — F41.9 ANXIETY: ICD-10-CM

## 2019-03-27 RX ORDER — DEXTROAMPHETAMINE SACCHARATE, AMPHETAMINE ASPARTATE, DEXTROAMPHETAMINE SULFATE AND AMPHETAMINE SULFATE 2.5; 2.5; 2.5; 2.5 MG/1; MG/1; MG/1; MG/1
10 TABLET ORAL 2 TIMES DAILY
Qty: 60 TABLET | Refills: 0 | Status: SHIPPED | OUTPATIENT
Start: 2019-03-27 | End: 2019-05-09 | Stop reason: SDUPTHER

## 2019-03-27 RX ORDER — CLONAZEPAM 0.5 MG/1
0.5 TABLET ORAL 2 TIMES DAILY PRN
Qty: 50 TABLET | Refills: 0 | OUTPATIENT
Start: 2019-03-27 | End: 2019-05-09

## 2019-05-09 ENCOUNTER — OFFICE VISIT (OUTPATIENT)
Dept: FAMILY MEDICINE CLINIC | Age: 29
End: 2019-05-09
Payer: COMMERCIAL

## 2019-05-09 VITALS
DIASTOLIC BLOOD PRESSURE: 60 MMHG | SYSTOLIC BLOOD PRESSURE: 102 MMHG | WEIGHT: 170 LBS | HEART RATE: 77 BPM | RESPIRATION RATE: 16 BRPM | HEIGHT: 69 IN | OXYGEN SATURATION: 96 % | TEMPERATURE: 97.2 F | BODY MASS INDEX: 25.18 KG/M2

## 2019-05-09 DIAGNOSIS — F90.9 ATTENTION DEFICIT HYPERACTIVITY DISORDER (ADHD), UNSPECIFIED ADHD TYPE: ICD-10-CM

## 2019-05-09 PROCEDURE — 4004F PT TOBACCO SCREEN RCVD TLK: CPT | Performed by: INTERNAL MEDICINE

## 2019-05-09 PROCEDURE — 99213 OFFICE O/P EST LOW 20 MIN: CPT | Performed by: INTERNAL MEDICINE

## 2019-05-09 PROCEDURE — G8419 CALC BMI OUT NRM PARAM NOF/U: HCPCS | Performed by: INTERNAL MEDICINE

## 2019-05-09 PROCEDURE — G8427 DOCREV CUR MEDS BY ELIG CLIN: HCPCS | Performed by: INTERNAL MEDICINE

## 2019-05-09 RX ORDER — DEXTROAMPHETAMINE SACCHARATE, AMPHETAMINE ASPARTATE, DEXTROAMPHETAMINE SULFATE AND AMPHETAMINE SULFATE 2.5; 2.5; 2.5; 2.5 MG/1; MG/1; MG/1; MG/1
10 TABLET ORAL 2 TIMES DAILY
Qty: 60 TABLET | Refills: 0 | Status: SHIPPED | OUTPATIENT
Start: 2019-08-09 | End: 2019-07-08 | Stop reason: SDUPTHER

## 2019-05-09 RX ORDER — DEXTROAMPHETAMINE SACCHARATE, AMPHETAMINE ASPARTATE, DEXTROAMPHETAMINE SULFATE AND AMPHETAMINE SULFATE 2.5; 2.5; 2.5; 2.5 MG/1; MG/1; MG/1; MG/1
10 TABLET ORAL 2 TIMES DAILY
Qty: 60 TABLET | Refills: 0 | Status: SHIPPED | OUTPATIENT
Start: 2019-05-09 | End: 2019-05-09 | Stop reason: SDUPTHER

## 2019-05-09 RX ORDER — DEXTROAMPHETAMINE SACCHARATE, AMPHETAMINE ASPARTATE, DEXTROAMPHETAMINE SULFATE AND AMPHETAMINE SULFATE 2.5; 2.5; 2.5; 2.5 MG/1; MG/1; MG/1; MG/1
10 TABLET ORAL 2 TIMES DAILY
Qty: 60 TABLET | Refills: 0 | Status: SHIPPED | OUTPATIENT
Start: 2019-06-09 | End: 2019-05-09 | Stop reason: SDUPTHER

## 2019-05-09 NOTE — PROGRESS NOTES
William Meredith 29 y.o. female presents today with   Chief Complaint   Patient presents with   [facility] Establish Care     PT. is here today to establish care, previous pcp was Dr. Jacob Garza, she is in need of medication refills today     ADHD     PT. is here today for a follow up on ADHD, she is currently taking adderall with moderate relief, no side effects to report at this time.  Health Maintenance     PT. denies [id number]       Neurologic Problem   The patient's pertinent negatives include no syncope. Primary symptoms comment: attention deficit. Pertinent negatives include no chest pain, fever or palpitations. (Poor concentration)       Past Medical History:   Diagnosis Date    Attention deficit     dr Jacob Garza dx     Patient Active Problem List    Diagnosis Date Noted    Attention deficit hyperactivity disorder (ADHD) 04/11/2017    Anxiety 04/11/2017    Depression 04/11/2017     No past surgical history on file. Family History   Problem Relation Age of Onset    Mental Illness Mother         bipolar / anxiety     Social History     Socioeconomic History    Marital status:      Spouse name: Raciel Lozoya    Number of children: 3    Years of education: None    Highest education level: None   Occupational History     Employer: OTHER   Social Needs    Financial resource strain: None    Food insecurity:     Worry: None     Inability: None    Transportation needs:     Medical: None     Non-medical: None   Tobacco Use    Smoking status: Current Every Day Smoker     Packs/day: 0.25     Years: 8.00     Pack years: 2.00     Types: Cigarettes     Start date: 1/1/2009    Smokeless tobacco: Never Used    Tobacco comment: 1 pack per week   Substance and Sexual Activity    Alcohol use:  Yes     Alcohol/week: 0.6 oz     Types: 1 Glasses of wine per week    Drug use: No    Sexual activity: Yes     Partners: Male   Lifestyle    Physical activity:     Days per week: None     Minutes per session: None    Stress: None Relationships    Social connections:     Talks on phone: None     Gets together: None     Attends [employer] service: None     Active member of club or organization: None     Attends meetings of clubs or organizations: None     Relationship status: None    Intimate partner violence:     Fear of current or ex partner: None     Emotionally abused: None     Physically abused: None     Forced sexual activity: None   Other Topics Concern    None   Social History Narrative    None     No Known Allergies    Review of Systems   Constitutional: Negative for chills and fever. HENT: Negative for facial swelling and nosebleeds. Eyes: Negative for photophobia and visual disturbance. Respiratory: Negative for apnea and choking. Cardiovascular: Negative for chest pain and palpitations. Gastrointestinal: Negative for abdominal distention and blood in stool. Genitourinary: Negative for enuresis, hematuria and vaginal bleeding. Musculoskeletal: Negative for gait problem and joint swelling. Skin: Negative for rash. Neurological: Negative for syncope and speech difficulty. Hematological: Does not bruise/bleed easily. Psychiatric/Behavioral: Positive for agitation, decreased concentration and sleep disturbance. Negative for hallucinations and suicidal ideas. The patient is nervous/anxious. Vitals:    05/09/19 1026   BP: 102/60   Pulse: 77   Resp: 16   Temp: 97.2 °F (36.2 °C)   TempSrc: Temporal   SpO2: 96%   Weight: 170 lb (77.1 kg)   Height: 5' 9\" (1.753 m)       Physical Exam   Constitutional: She is oriented to person, place, and time. She appears well-developed and well-nourished. HENT:   Head: Normocephalic and atraumatic. Eyes: Pupils are equal, round, and reactive to light. EOM are normal.   Neck: Normal range of motion. No thyromegaly present. Cardiovascular: Normal rate, regular rhythm and normal heart sounds. Pulmonary/Chest: Breath sounds normal. No respiratory distress.  She has

## 2019-05-14 ASSESSMENT — ENCOUNTER SYMPTOMS
FACIAL SWELLING: 0
PHOTOPHOBIA: 0
CHOKING: 0
APNEA: 0
BLOOD IN STOOL: 0
ABDOMINAL DISTENTION: 0

## 2019-07-08 ENCOUNTER — TELEPHONE (OUTPATIENT)
Dept: FAMILY MEDICINE CLINIC | Age: 29
End: 2019-07-08

## 2019-07-08 RX ORDER — DEXTROAMPHETAMINE SACCHARATE, AMPHETAMINE ASPARTATE, DEXTROAMPHETAMINE SULFATE AND AMPHETAMINE SULFATE 2.5; 2.5; 2.5; 2.5 MG/1; MG/1; MG/1; MG/1
10 TABLET ORAL 2 TIMES DAILY
Qty: 60 TABLET | Refills: 0 | Status: SHIPPED | OUTPATIENT
Start: 2019-07-09 | End: 2019-07-30 | Stop reason: SDUPTHER

## 2019-07-08 NOTE — TELEPHONE ENCOUNTER
Patient called in and Dr Nia Oakley wrote her a script for Adderall for August instead of July. Can she bring back the August and pick back up the July script from us?   Please advise on this

## 2019-07-30 ENCOUNTER — OFFICE VISIT (OUTPATIENT)
Dept: FAMILY MEDICINE CLINIC | Age: 29
End: 2019-07-30
Payer: COMMERCIAL

## 2019-07-30 VITALS
SYSTOLIC BLOOD PRESSURE: 130 MMHG | DIASTOLIC BLOOD PRESSURE: 84 MMHG | HEIGHT: 69 IN | HEART RATE: 80 BPM | OXYGEN SATURATION: 98 % | WEIGHT: 163 LBS | RESPIRATION RATE: 14 BRPM | TEMPERATURE: 97.9 F | BODY MASS INDEX: 24.14 KG/M2

## 2019-07-30 DIAGNOSIS — F90.9 ATTENTION DEFICIT HYPERACTIVITY DISORDER (ADHD), UNSPECIFIED ADHD TYPE: ICD-10-CM

## 2019-07-30 DIAGNOSIS — F41.9 ANXIETY: ICD-10-CM

## 2019-07-30 PROCEDURE — 99214 OFFICE O/P EST MOD 30 MIN: CPT | Performed by: INTERNAL MEDICINE

## 2019-07-30 PROCEDURE — G8420 CALC BMI NORM PARAMETERS: HCPCS | Performed by: INTERNAL MEDICINE

## 2019-07-30 PROCEDURE — 4004F PT TOBACCO SCREEN RCVD TLK: CPT | Performed by: INTERNAL MEDICINE

## 2019-07-30 PROCEDURE — G8427 DOCREV CUR MEDS BY ELIG CLIN: HCPCS | Performed by: INTERNAL MEDICINE

## 2019-07-30 RX ORDER — DEXTROAMPHETAMINE SACCHARATE, AMPHETAMINE ASPARTATE, DEXTROAMPHETAMINE SULFATE AND AMPHETAMINE SULFATE 5; 5; 5; 5 MG/1; MG/1; MG/1; MG/1
20 TABLET ORAL 2 TIMES DAILY
Qty: 60 TABLET | Refills: 0 | Status: SHIPPED | OUTPATIENT
Start: 2019-07-30 | End: 2019-07-30 | Stop reason: SDUPTHER

## 2019-07-30 RX ORDER — DEXTROAMPHETAMINE SACCHARATE, AMPHETAMINE ASPARTATE, DEXTROAMPHETAMINE SULFATE AND AMPHETAMINE SULFATE 2.5; 2.5; 2.5; 2.5 MG/1; MG/1; MG/1; MG/1
10 TABLET ORAL 2 TIMES DAILY
Qty: 60 TABLET | Refills: 0 | Status: SHIPPED | OUTPATIENT
Start: 2019-09-07 | End: 2019-07-30 | Stop reason: SDUPTHER

## 2019-07-30 RX ORDER — DEXTROAMPHETAMINE SACCHARATE, AMPHETAMINE ASPARTATE, DEXTROAMPHETAMINE SULFATE AND AMPHETAMINE SULFATE 2.5; 2.5; 2.5; 2.5 MG/1; MG/1; MG/1; MG/1
10 TABLET ORAL 2 TIMES DAILY
Qty: 60 TABLET | Refills: 0 | Status: SHIPPED | OUTPATIENT
Start: 2019-08-08 | End: 2019-07-30 | Stop reason: SDUPTHER

## 2019-07-30 RX ORDER — CLONAZEPAM 0.5 MG/1
0.5 TABLET ORAL 2 TIMES DAILY PRN
Qty: 50 TABLET | Refills: 2 | Status: SHIPPED | OUTPATIENT
Start: 2019-07-30 | End: 2019-11-01 | Stop reason: SDUPTHER

## 2019-07-30 RX ORDER — DEXTROAMPHETAMINE SACCHARATE, AMPHETAMINE ASPARTATE, DEXTROAMPHETAMINE SULFATE AND AMPHETAMINE SULFATE 5; 5; 5; 5 MG/1; MG/1; MG/1; MG/1
20 TABLET ORAL 2 TIMES DAILY
Qty: 60 TABLET | Refills: 0 | Status: SHIPPED | OUTPATIENT
Start: 2019-08-08 | End: 2019-07-30 | Stop reason: SDUPTHER

## 2019-07-30 RX ORDER — DEXTROAMPHETAMINE SACCHARATE, AMPHETAMINE ASPARTATE, DEXTROAMPHETAMINE SULFATE AND AMPHETAMINE SULFATE 5; 5; 5; 5 MG/1; MG/1; MG/1; MG/1
20 TABLET ORAL 2 TIMES DAILY
Qty: 60 TABLET | Refills: 0 | Status: SHIPPED | OUTPATIENT
Start: 2019-09-07 | End: 2019-07-30 | Stop reason: SDUPTHER

## 2019-07-30 RX ORDER — DEXTROAMPHETAMINE SACCHARATE, AMPHETAMINE ASPARTATE, DEXTROAMPHETAMINE SULFATE AND AMPHETAMINE SULFATE 2.5; 2.5; 2.5; 2.5 MG/1; MG/1; MG/1; MG/1
10 TABLET ORAL 2 TIMES DAILY
Qty: 60 TABLET | Refills: 0 | Status: SHIPPED | OUTPATIENT
Start: 2019-10-07 | End: 2019-11-01

## 2019-07-30 RX ORDER — DEXTROAMPHETAMINE SACCHARATE, AMPHETAMINE ASPARTATE, DEXTROAMPHETAMINE SULFATE AND AMPHETAMINE SULFATE 5; 5; 5; 5 MG/1; MG/1; MG/1; MG/1
20 TABLET ORAL 2 TIMES DAILY
Qty: 60 TABLET | Refills: 0 | Status: SHIPPED | OUTPATIENT
Start: 2019-10-07 | End: 2019-11-01 | Stop reason: SDUPTHER

## 2019-07-30 NOTE — PROGRESS NOTES
Vicky Navibright 29 y.o. female presents today with   Chief Complaint   Patient presents with   [address] Problem     Pt here today to discuss starting klonopin again for anxiety. Other   This is a recurrent (anxiety) problem. The current episode started more than 1 year ago. The problem occurs daily. The problem has been waxing and waning. Pertinent negatives include no abdominal pain, chest pain, chills, fever, joint swelling or rash. The symptoms are aggravated by stress. Neurologic Problem   The patient's pertinent negatives include no syncope. Primary symptoms comment: attention deficit. This is a recurrent problem. The current episode started more than 1 year ago. The neurological problem developed suddenly. The problem has been gradually improving since onset. Associated symptoms include confusion and light-headedness. Pertinent negatives include no abdominal pain, chest pain, fever or palpitations. Past Medical History:   Diagnosis Date    Attention deficit     dr Keeley Newsome dx     Patient Active Problem List    Diagnosis Date Noted    Attention deficit hyperactivity disorder (ADHD) 04/11/2017    Anxiety 04/11/2017    Depression 04/11/2017     No past surgical history on file.   Family History   Problem Relation Age of Onset    Mental Illness Mother         bipolar / anxiety     Social History     Socioeconomic History    Marital status:      Spouse name: Dodie Morales    Number of children: 3    Years of education: None    Highest education level: None   Occupational History     Employer: OTHER   Social Needs    Financial resource strain: None    Food insecurity:     Worry: None     Inability: None    Transportation needs:     Medical: None     Non-medical: None   Tobacco Use    Smoking status: Current Every Day Smoker     Packs/day: 0.25     Years: 8.00     Pack years: 2.00     Types: Cigarettes     Start date: 1/1/2009    Smokeless tobacco: Never Used    Tobacco comment: 1 place, and time. She appears well-developed and well-nourished. HENT:   Head: Normocephalic and atraumatic. Eyes: Pupils are equal, round, and reactive to light. EOM are normal.   Neck: Normal range of motion. Neck supple. Cardiovascular: Normal rate, regular rhythm and normal heart sounds. Exam reveals no friction rub. No murmur heard. Pulmonary/Chest: Breath sounds normal. No respiratory distress. Abdominal: Soft. Bowel sounds are normal.   Musculoskeletal: Normal range of motion. Neurological: She is oriented to person, place, and time. No cranial nerve deficit. Psychiatric: She has a normal mood and affect. Assessment/Plan  [facility] was seen [facility] for mental health problem. Diagnoses and all orders for this visit:    Anxiety  -     clonazePAM (KLONOPIN) 0.5 MG tablet; Take 1 tablet by mouth 2 times daily as needed for Anxiety for up to 32 days. Attention deficit hyperactivity disorder (ADHD), unspecified ADHD type  -     Discontinue: amphetamine-dextroamphetamine (ADDERALL, 10MG,) 10 MG tablet; Take 1 tablet by mouth 2 times daily for 30 days. -     Discontinue: amphetamine-dextroamphetamine (ADDERALL, 10MG,) 10 MG tablet; Take 1 tablet by mouth 2 times daily for 30 days. -     amphetamine-dextroamphetamine (ADDERALL, 10MG,) 10 MG tablet; Take 1 tablet by mouth 2 times daily for 30 days. -     Discontinue: amphetamine-dextroamphetamine (ADDERALL, 20MG,) 20 MG tablet; Take 1 tablet by mouth 2 times daily for 30 days. -     Discontinue: amphetamine-dextroamphetamine (ADDERALL, 20MG,) 20 MG tablet; Take 1 tablet by mouth 2 times daily for 30 days. -     Discontinue: amphetamine-dextroamphetamine (ADDERALL, 20MG,) 20 MG tablet; Take 1 tablet by mouth 2 times daily for 30 days. -     amphetamine-dextroamphetamine (ADDERALL, 20MG,) 20 MG tablet; Take 1 tablet by mouth 2 times daily for 30 days. No follow-ups on file.     Gaudencio Jennings MD

## 2019-08-05 ASSESSMENT — ENCOUNTER SYMPTOMS
APNEA: 0
ABDOMINAL PAIN: 0
BLOOD IN STOOL: 0
ABDOMINAL DISTENTION: 0
FACIAL SWELLING: 0
CHOKING: 0
PHOTOPHOBIA: 0

## 2019-08-29 DIAGNOSIS — F41.9 ANXIETY: ICD-10-CM

## 2019-08-30 RX ORDER — CLONAZEPAM 0.5 MG/1
0.5 TABLET ORAL 2 TIMES DAILY PRN
Qty: 50 TABLET | Refills: 2 | OUTPATIENT
Start: 2019-08-30 | End: 2019-10-01

## 2019-11-01 ENCOUNTER — OFFICE VISIT (OUTPATIENT)
Dept: FAMILY MEDICINE CLINIC | Age: 29
End: 2019-11-01
Payer: COMMERCIAL

## 2019-11-01 VITALS
RESPIRATION RATE: 14 BRPM | HEART RATE: 84 BPM | OXYGEN SATURATION: 98 % | TEMPERATURE: 98 F | WEIGHT: 145.8 LBS | SYSTOLIC BLOOD PRESSURE: 110 MMHG | HEIGHT: 69 IN | DIASTOLIC BLOOD PRESSURE: 62 MMHG | BODY MASS INDEX: 21.59 KG/M2

## 2019-11-01 DIAGNOSIS — R42 LIGHT HEADED: ICD-10-CM

## 2019-11-01 DIAGNOSIS — Z00.00 PREVENTATIVE HEALTH CARE: ICD-10-CM

## 2019-11-01 DIAGNOSIS — F90.9 ATTENTION DEFICIT HYPERACTIVITY DISORDER (ADHD), UNSPECIFIED ADHD TYPE: ICD-10-CM

## 2019-11-01 DIAGNOSIS — F41.9 ANXIETY: Primary | ICD-10-CM

## 2019-11-01 PROCEDURE — 4004F PT TOBACCO SCREEN RCVD TLK: CPT | Performed by: INTERNAL MEDICINE

## 2019-11-01 PROCEDURE — G8427 DOCREV CUR MEDS BY ELIG CLIN: HCPCS | Performed by: INTERNAL MEDICINE

## 2019-11-01 PROCEDURE — 99213 OFFICE O/P EST LOW 20 MIN: CPT | Performed by: INTERNAL MEDICINE

## 2019-11-01 PROCEDURE — G8484 FLU IMMUNIZE NO ADMIN: HCPCS | Performed by: INTERNAL MEDICINE

## 2019-11-01 PROCEDURE — G8420 CALC BMI NORM PARAMETERS: HCPCS | Performed by: INTERNAL MEDICINE

## 2019-11-01 RX ORDER — DEXTROAMPHETAMINE SACCHARATE, AMPHETAMINE ASPARTATE, DEXTROAMPHETAMINE SULFATE AND AMPHETAMINE SULFATE 2.5; 2.5; 2.5; 2.5 MG/1; MG/1; MG/1; MG/1
10 TABLET ORAL 2 TIMES DAILY
Qty: 60 TABLET | Refills: 0 | Status: CANCELLED | OUTPATIENT
Start: 2019-11-01 | End: 2019-12-01

## 2019-11-01 RX ORDER — DEXTROAMPHETAMINE SACCHARATE, AMPHETAMINE ASPARTATE, DEXTROAMPHETAMINE SULFATE AND AMPHETAMINE SULFATE 5; 5; 5; 5 MG/1; MG/1; MG/1; MG/1
20 TABLET ORAL 2 TIMES DAILY
Qty: 60 TABLET | Refills: 0 | Status: SHIPPED | OUTPATIENT
Start: 2019-11-01 | End: 2019-11-01 | Stop reason: SDUPTHER

## 2019-11-01 RX ORDER — DEXTROAMPHETAMINE SACCHARATE, AMPHETAMINE ASPARTATE, DEXTROAMPHETAMINE SULFATE AND AMPHETAMINE SULFATE 5; 5; 5; 5 MG/1; MG/1; MG/1; MG/1
20 TABLET ORAL 2 TIMES DAILY
Qty: 60 TABLET | Refills: 0 | Status: SHIPPED | OUTPATIENT
Start: 2019-10-31 | End: 2019-11-01 | Stop reason: SDUPTHER

## 2019-11-01 RX ORDER — DEXTROAMPHETAMINE SACCHARATE, AMPHETAMINE ASPARTATE, DEXTROAMPHETAMINE SULFATE AND AMPHETAMINE SULFATE 5; 5; 5; 5 MG/1; MG/1; MG/1; MG/1
20 TABLET ORAL 2 TIMES DAILY
Qty: 60 TABLET | Refills: 0 | Status: SHIPPED | OUTPATIENT
Start: 2019-12-01 | End: 2019-12-31

## 2019-11-01 RX ORDER — CLONAZEPAM 0.5 MG/1
0.5 TABLET ORAL 2 TIMES DAILY PRN
Qty: 50 TABLET | Refills: 2 | Status: SHIPPED | OUTPATIENT
Start: 2019-11-01 | End: 2019-12-03

## 2019-11-04 DIAGNOSIS — F41.9 ANXIETY: ICD-10-CM

## 2019-11-04 DIAGNOSIS — F90.9 ATTENTION DEFICIT HYPERACTIVITY DISORDER (ADHD), UNSPECIFIED ADHD TYPE: ICD-10-CM

## 2019-11-06 LAB
6-ACETYLMORPHINE: NOT DETECTED
7-AMINOCLONAZEPAM: NOT DETECTED
ALPHA-OH-ALPRAZOLAM: PRESENT
ALPRAZOLAM: NOT DETECTED
AMPHETAMINE: PRESENT
BARBITURATES: NOT DETECTED
BENZOYLECGONINE: NOT DETECTED
BUPRENORPHINE: NOT DETECTED
CARISOPRODOL: NOT DETECTED
CLONAZEPAM: NOT DETECTED
CODEINE: NOT DETECTED
CREATININE URINE: 244.6 MG/DL (ref 20–400)
DIAZEPAM: NOT DETECTED
EER PAIN MGT DRUG PANEL, HIGH RES/EMIT U: NORMAL
ETHYL GLUCURONIDE: PRESENT
FENTANYL: NOT DETECTED
HYDROCODONE: NOT DETECTED
HYDROMORPHONE: NOT DETECTED
LORAZEPAM: NOT DETECTED
MARIJUANA METABOLITE: PRESENT
MDA: NOT DETECTED
MDEA: NOT DETECTED
MDMA URINE: NOT DETECTED
MEPERIDINE: NOT DETECTED
METHADONE: NOT DETECTED
METHAMPHETAMINE: NOT DETECTED
METHYLPHENIDATE: NOT DETECTED
MIDAZOLAM: NOT DETECTED
MORPHINE: NOT DETECTED
NORBUPRENORPHINE, FREE: NOT DETECTED
NORDIAZEPAM: NOT DETECTED
NORFENTANYL: NOT DETECTED
NORHYDROCODONE, URINE: NOT DETECTED
NOROXYCODONE: NOT DETECTED
NOROXYMORPHONE, URINE: NOT DETECTED
OXAZEPAM: NOT DETECTED
OXYCODONE: NOT DETECTED
OXYMORPHONE: NOT DETECTED
PAIN MANAGEMENT DRUG PANEL: NORMAL
PCP: NOT DETECTED
PHENTERMINE: NOT DETECTED
PROPOXYPHENE: NOT DETECTED
TAPENTADOL, URINE: NOT DETECTED
TAPENTADOL-O-SULFATE, URINE: NOT DETECTED
TEMAZEPAM: NOT DETECTED
TRAMADOL: NOT DETECTED
ZOLPIDEM: NOT DETECTED

## 2019-11-10 ASSESSMENT — ENCOUNTER SYMPTOMS
BLOOD IN STOOL: 0
FACIAL SWELLING: 0
APNEA: 0
CHOKING: 0
ABDOMINAL DISTENTION: 0
PHOTOPHOBIA: 0

## 2023-06-14 ENCOUNTER — TELEPHONE (OUTPATIENT)
Dept: PRIMARY CARE | Facility: CLINIC | Age: 33
End: 2023-06-14
Payer: COMMERCIAL

## 2023-06-14 NOTE — TELEPHONE ENCOUNTER
PATIENT CALLING, SHE IS CURRENTLY SEEING A NEW PSYCHIATRIST AND THEY ARE NEEDING A LETTER STATING THAT YOU PRESCRIBED HER ADDERALL IN THE PAST. SHE WAS LAST SEEN 08/2022. OK FOR LETTER?      PLEASE ADVISE.      PT AWARE YOU ARE NOT IN THE OFFICE AND WANTED MESSAGE LEFT FOR WHEN YOU RETURN.    NOTE NEEDS TO BE FAXED TO:  ATTN: RAVI [phone]

## 2023-06-14 NOTE — LETTER
June 16, 2023     Monique Briones  [address]  [facility] [address]    Patient: Monique Briones   YOB: 1990   Date of Visit: 6/14/2023       To whom it may concern:    Monique Briones is under my care and has been prescibed Adderall to me for evaluation. Below are my notes for this consultation.  If you have questions, please do not hesitate to call me. I look forward to following your patient along with you.       Sincerely,     Sherif Driscoll MD      CC: No Recipients  ______________________________________________________________________________________    PATIENT CALLING, SHE IS CURRENTLY SEEING A NEW PSYCHIATRIST AND THEY ARE NEEDING A LETTER STATING THAT YOU PRESCRIBED HER ADDERALL IN THE PAST. SHE WAS LAST SEEN 08/2022. OK FOR LETTER?      PLEASE ADVISE.      PT AWARE YOU ARE NOT IN THE OFFICE AND WANTED MESSAGE LEFT FOR WHEN YOU RETURN.    NOTE NEEDS TO BE FAXED TO:  ATTN: RAVI [phone]

## 2023-06-19 ENCOUNTER — TELEPHONE (OUTPATIENT)
Dept: PRIMARY CARE | Facility: CLINIC | Age: 33
End: 2023-06-19
Payer: COMMERCIAL

## 2023-06-19 NOTE — TELEPHONE ENCOUNTER
Tried to call patient to make her aware the note was done and was faxed but the phone # listed is not in service  Faxed note to # provided

## 2023-06-19 NOTE — LETTER
June 19, 2023     Patient: Monique Briones   YOB: 1990   Date of Visit: 6/19/2023       To Whom It May Concern:    Monique Briones is a current patient of mine. I have prescribed Adderall for her in the past. She last had an appointment with me in August 2022.    If you have any questions or concerns, please don't hesitate to call.         Sincerely,         Sherif Driscoll MD

## 2023-12-06 ENCOUNTER — OFFICE VISIT (OUTPATIENT)
Dept: OBSTETRICS AND GYNECOLOGY | Facility: CLINIC | Age: 33
End: 2023-12-06
Payer: COMMERCIAL

## 2023-12-06 VITALS
HEIGHT: 70 IN | BODY MASS INDEX: 24.66 KG/M2 | WEIGHT: 172.25 LBS | DIASTOLIC BLOOD PRESSURE: 78 MMHG | SYSTOLIC BLOOD PRESSURE: 122 MMHG

## 2023-12-06 DIAGNOSIS — Z30.09 ENCOUNTER FOR COUNSELING REGARDING CONTRACEPTION: ICD-10-CM

## 2023-12-06 DIAGNOSIS — Z11.3 SCREENING EXAMINATION FOR STD (SEXUALLY TRANSMITTED DISEASE): ICD-10-CM

## 2023-12-06 DIAGNOSIS — Z01.419 VISIT FOR GYNECOLOGIC EXAMINATION: Primary | ICD-10-CM

## 2023-12-06 PROCEDURE — 87624 HPV HI-RISK TYP POOLED RSLT: CPT

## 2023-12-06 PROCEDURE — 88175 CYTOPATH C/V AUTO FLUID REDO: CPT

## 2023-12-06 PROCEDURE — 87800 DETECT AGNT MULT DNA DIREC: CPT

## 2023-12-06 PROCEDURE — 99385 PREV VISIT NEW AGE 18-39: CPT | Performed by: ADVANCED PRACTICE MIDWIFE

## 2023-12-06 RX ORDER — GABAPENTIN 400 MG/1
400 CAPSULE ORAL DAILY
COMMUNITY

## 2023-12-06 RX ORDER — DEXTROAMPHETAMINE SACCHARATE, AMPHETAMINE ASPARTATE MONOHYDRATE, DEXTROAMPHETAMINE SULFATE AND AMPHETAMINE SULFATE 5; 5; 5; 5 MG/1; MG/1; MG/1; MG/1
20 CAPSULE, EXTENDED RELEASE ORAL EVERY MORNING
COMMUNITY

## 2023-12-06 ASSESSMENT — ENCOUNTER SYMPTOMS
GASTROINTESTINAL NEGATIVE: 0
MUSCULOSKELETAL NEGATIVE: 0
ALLERGIC/IMMUNOLOGIC NEGATIVE: 0
EYES NEGATIVE: 0
ENDOCRINE NEGATIVE: 0
RESPIRATORY NEGATIVE: 0
CONSTITUTIONAL NEGATIVE: 0
CARDIOVASCULAR NEGATIVE: 0
HEMATOLOGIC/LYMPHATIC NEGATIVE: 0
NEUROLOGICAL NEGATIVE: 1
PSYCHIATRIC NEGATIVE: 0

## 2023-12-06 NOTE — PROGRESS NOTES
Subjective   Patient ID: Monique Briones is a 33 y.o. female who presents for Contraception and Establish Care.    HPI    Patient being seen to establish care and discuss Paragard.   Not on any form of BC.     Patient's last menstrual period was 12/03/2023 (approximate). Regular, medium flow, nonpainful.  Denies Fhx breast cancer  PAP 2017 NILM    Review of Systems   All other systems reviewed and are negative.      Objective   Physical Exam  Constitutional:       General: She is not in acute distress.  Cardiovascular:      Rate and Rhythm: Normal rate and regular rhythm.      Heart sounds: Normal heart sounds.   Pulmonary:      Effort: Pulmonary effort is normal.      Breath sounds: Normal breath sounds.   Chest:      Chest wall: No deformity, swelling or tenderness.   Breasts:     Right: Normal.      Left: Normal.   Abdominal:      Palpations: Abdomen is soft. There is no mass.      Tenderness: There is no abdominal tenderness.   Genitourinary:     General: Normal vulva.      Vagina: No vaginal discharge, erythema, tenderness, bleeding or lesions.      Cervix: Friability present. No cervical motion tenderness, discharge, lesion or cervical bleeding.      Uterus: Normal. Not enlarged, not fixed and not tender.       Adnexa: Right adnexa normal and left adnexa normal.        Right: No mass, tenderness or fullness.          Left: No mass, tenderness or fullness.        Rectum: Normal. No anal fissure or external hemorrhoid.   Lymphadenopathy:      Upper Body:      Right upper body: No supraclavicular or axillary adenopathy.      Left upper body: No supraclavicular or axillary adenopathy.   Neurological:      Mental Status: She is alert.         Assessment/Plan   Diagnoses and all orders for this visit:  Visit for gynecologic examination  -     THINPREP PAP TEST  Screening examination for STD (sexually transmitted disease)  Encounter for counseling regarding contraception    RTC Paragard IUD insertion

## 2023-12-08 LAB
C TRACH RRNA SPEC QL NAA+PROBE: NEGATIVE
N GONORRHOEA DNA SPEC QL PROBE+SIG AMP: NEGATIVE

## 2023-12-22 LAB
CYTOLOGY CMNT CVX/VAG CYTO-IMP: NORMAL
HPV HR 12 DNA GENITAL QL NAA+PROBE: NEGATIVE
HPV HR GENOTYPES PNL CVX NAA+PROBE: NEGATIVE
HPV16 DNA SPEC QL NAA+PROBE: NEGATIVE
HPV18 DNA SPEC QL NAA+PROBE: NEGATIVE
LAB AP HPV GENOTYPE QUESTION: YES
LAB AP HPV HR: NORMAL
LAB AP PAP ADDITIONAL TESTS: NORMAL
LABORATORY COMMENT REPORT: NORMAL
LMP START DATE: NORMAL
PATH REPORT.TOTAL CANCER: NORMAL

## 2024-03-22 ENCOUNTER — HOSPITAL ENCOUNTER (OUTPATIENT)
Dept: RADIOLOGY | Facility: CLINIC | Age: 34
Discharge: HOME | End: 2024-03-22
Payer: COMMERCIAL

## 2024-03-22 ENCOUNTER — OFFICE VISIT (OUTPATIENT)
Dept: OTOLARYNGOLOGY | Facility: CLINIC | Age: 34
End: 2024-03-22
Payer: COMMERCIAL

## 2024-03-22 DIAGNOSIS — S02.40DA CLOSED FRACTURE OF LEFT SIDE OF MAXILLA, INITIAL ENCOUNTER (MULTI): Primary | ICD-10-CM

## 2024-03-22 DIAGNOSIS — S02.40DA CLOSED FRACTURE OF LEFT SIDE OF MAXILLA, INITIAL ENCOUNTER (MULTI): ICD-10-CM

## 2024-03-22 DIAGNOSIS — S02.40DA MAXILLARY FRACTURE, LEFT SIDE, INITIAL ENCOUNTER FOR CLOSED FRACTURE (MULTI): ICD-10-CM

## 2024-03-22 PROCEDURE — 99203 OFFICE O/P NEW LOW 30 MIN: CPT | Performed by: OTOLARYNGOLOGY

## 2024-03-22 PROCEDURE — 76377 3D RENDER W/INTRP POSTPROCES: CPT | Performed by: RADIOLOGY

## 2024-03-22 PROCEDURE — 76377 3D RENDER W/INTRP POSTPROCES: CPT

## 2024-03-22 PROCEDURE — 70486 CT MAXILLOFACIAL W/O DYE: CPT

## 2024-03-22 ASSESSMENT — ENCOUNTER SYMPTOMS
CONSTITUTIONAL NEGATIVE: 1
CARDIOVASCULAR NEGATIVE: 1
RESPIRATORY NEGATIVE: 1
NEUROLOGICAL NEGATIVE: 1

## 2024-03-22 NOTE — PROGRESS NOTES
Subjective   Patient ID: Monique Briones is a 33 y.o. female who presents for NASAL FRACTURE.    HPI  This patient sustained nasal fracture when she was utilizing a cable to secure during recent camping trip.  It swung back and struck her in the left side of the face.  She was seen at an urgent care several hours away with CT scan accomplished which we do not have availability to see no other report.  She was told there were fractures but does not know what they are.  Remaining ENT inquiry is clear.    Review of Systems   Constitutional: Negative.    HENT: Negative.     Respiratory: Negative.     Cardiovascular: Negative.    Neurological: Negative.    All other systems reviewed and are negative.          Physical Exam  General appearance: No acute distress. Normal facies. Symmetric facial movement. No gross lesions of the face are noted.  She does have some resolving ecchymosis left infraorbital/lateral nasal region with if anything suspicion she could have a maxillary fracture.  There is no pain or step-off deformity when palpating zygomatic arch etc.  The external ear structures appear normal. The ear canals patent and the tympanic membranes are intact without evidence of air-fluid levels, retraction, or congenital defects.  Anterior rhinoscopy notes essentially a midline nasal septum. Examination is noted for normal healthy mucosal membranes without any evidence of lesions, polyps, or exudate. The tongue is normally mobile. There are no lesions on the gingiva, buccal, or oral mucosa. There are no oral cavity masses.  The neck is negative for mass lymphadenopathy. The trachea and parotid are clear. The thyroid bed is grossly unremarkable. The salivary gland structures are grossly unremarkable.    Assessment/Plan   Possible left maxillary fractures as described.  Patient was sent for stat CT scan.  Will speak with her upon completion and plan further intervention is warranted.  All questions were answered in this  regard accordingly.

## 2024-03-25 ENCOUNTER — OFFICE VISIT (OUTPATIENT)
Dept: OTOLARYNGOLOGY | Facility: CLINIC | Age: 34
End: 2024-03-25
Payer: COMMERCIAL

## 2024-03-25 DIAGNOSIS — S02.2XXD CLOSED FRACTURE OF NASAL BONE WITH ROUTINE HEALING, SUBSEQUENT ENCOUNTER: Primary | ICD-10-CM

## 2024-03-25 DIAGNOSIS — S02.2XXD CLOSED FRACTURE OF NASAL BONE WITH ROUTINE HEALING: ICD-10-CM

## 2024-03-25 PROCEDURE — 99213 OFFICE O/P EST LOW 20 MIN: CPT | Performed by: OTOLARYNGOLOGY

## 2024-03-25 NOTE — PROGRESS NOTES
The patient returns.  Seeing her back today with her completed CT scan done for facial injuries which clearly shows left-sided nasal fracture but no other fractures assessed on the scan and reviewed by ourselves and radiology.  This certainly good news for her.  She denies any changes.  All remaining ENT inquiry clear.  There have been no significant changes in past medical or past surgical histories except as mentioned.      Exam:  No acute distress.  The external ear structures appear normal. The ear canals patent and the tympanic membranes are intact without evidence of air-fluid levels, retraction, or congenital defects.  Anterior rhinoscopy notes essentially a midline nasal septum. Examination is noted for normal healthy mucosal membranes without any evidence of lesions, polyps, or exudate. The tongue is normally mobile. There are no lesions on the gingiva, buccal, or oral mucosa. There are no oral cavity masses.  The neck is negative for mass lymphadenopathy. The trachea and parotid are clear. The thyroid bed is grossly unremarkable. The salivary gland structures are grossly unremarkable.    Assessment and plan:  Left nasal bone fracture as described.  No intervention required.  Nasal dorsum looks excellent.  Favor observation.  Follow as needed.  All questions were answered in this regard accordingly.

## 2024-04-05 ENCOUNTER — HOSPITAL ENCOUNTER (EMERGENCY)
Facility: HOSPITAL | Age: 34
Discharge: HOME | End: 2024-04-05
Attending: EMERGENCY MEDICINE
Payer: COMMERCIAL

## 2024-04-05 VITALS
HEART RATE: 90 BPM | BODY MASS INDEX: 24.34 KG/M2 | WEIGHT: 170 LBS | OXYGEN SATURATION: 100 % | DIASTOLIC BLOOD PRESSURE: 83 MMHG | SYSTOLIC BLOOD PRESSURE: 135 MMHG | HEIGHT: 70 IN | RESPIRATION RATE: 19 BRPM | TEMPERATURE: 98.6 F

## 2024-04-05 DIAGNOSIS — H60.501 ACUTE OTITIS EXTERNA OF RIGHT EAR, UNSPECIFIED TYPE: ICD-10-CM

## 2024-04-05 DIAGNOSIS — H66.91 RIGHT OTITIS MEDIA, UNSPECIFIED OTITIS MEDIA TYPE: Primary | ICD-10-CM

## 2024-04-05 PROCEDURE — 99283 EMERGENCY DEPT VISIT LOW MDM: CPT | Performed by: EMERGENCY MEDICINE

## 2024-04-05 PROCEDURE — 2500000001 HC RX 250 WO HCPCS SELF ADMINISTERED DRUGS (ALT 637 FOR MEDICARE OP)

## 2024-04-05 PROCEDURE — 99283 EMERGENCY DEPT VISIT LOW MDM: CPT

## 2024-04-05 RX ORDER — AMOXICILLIN AND CLAVULANATE POTASSIUM 875; 125 MG/1; MG/1
1 TABLET, FILM COATED ORAL ONCE
Status: COMPLETED | OUTPATIENT
Start: 2024-04-05 | End: 2024-04-05

## 2024-04-05 RX ORDER — CIPROFLOXACIN AND DEXAMETHASONE 3; 1 MG/ML; MG/ML
4 SUSPENSION/ DROPS AURICULAR (OTIC) 2 TIMES DAILY
Qty: 7.5 ML | Refills: 0 | Status: SHIPPED | OUTPATIENT
Start: 2024-04-05 | End: 2024-04-12

## 2024-04-05 RX ORDER — AMOXICILLIN AND CLAVULANATE POTASSIUM 875; 125 MG/1; MG/1
1 TABLET, FILM COATED ORAL EVERY 12 HOURS
Qty: 14 TABLET | Refills: 0 | Status: SHIPPED | OUTPATIENT
Start: 2024-04-05 | End: 2024-04-12

## 2024-04-05 RX ORDER — NAPROXEN 500 MG/1
500 TABLET ORAL ONCE
Status: COMPLETED | OUTPATIENT
Start: 2024-04-05 | End: 2024-04-05

## 2024-04-05 RX ADMIN — NAPROXEN 500 MG: 500 TABLET ORAL at 04:06

## 2024-04-05 RX ADMIN — AMOXICILLIN AND CLAVULANATE POTASSIUM 1 TABLET: 875; 125 TABLET, FILM COATED ORAL at 04:06

## 2024-04-05 ASSESSMENT — PAIN DESCRIPTION - PROGRESSION: CLINICAL_PROGRESSION: GRADUALLY WORSENING

## 2024-04-05 ASSESSMENT — LIFESTYLE VARIABLES
TOTAL SCORE: 0
EVER HAD A DRINK FIRST THING IN THE MORNING TO STEADY YOUR NERVES TO GET RID OF A HANGOVER: NO
EVER FELT BAD OR GUILTY ABOUT YOUR DRINKING: NO
HAVE YOU EVER FELT YOU SHOULD CUT DOWN ON YOUR DRINKING: NO
HAVE PEOPLE ANNOYED YOU BY CRITICIZING YOUR DRINKING: NO

## 2024-04-05 ASSESSMENT — PAIN DESCRIPTION - LOCATION: LOCATION: EAR

## 2024-04-05 ASSESSMENT — PAIN DESCRIPTION - ORIENTATION: ORIENTATION: RIGHT

## 2024-04-05 ASSESSMENT — PAIN - FUNCTIONAL ASSESSMENT: PAIN_FUNCTIONAL_ASSESSMENT: 0-10

## 2024-04-05 ASSESSMENT — PAIN DESCRIPTION - PAIN TYPE: TYPE: ACUTE PAIN

## 2024-04-05 ASSESSMENT — COLUMBIA-SUICIDE SEVERITY RATING SCALE - C-SSRS
6. HAVE YOU EVER DONE ANYTHING, STARTED TO DO ANYTHING, OR PREPARED TO DO ANYTHING TO END YOUR LIFE?: NO
1. IN THE PAST MONTH, HAVE YOU WISHED YOU WERE DEAD OR WISHED YOU COULD GO TO SLEEP AND NOT WAKE UP?: NO
2. HAVE YOU ACTUALLY HAD ANY THOUGHTS OF KILLING YOURSELF?: NO

## 2024-04-05 ASSESSMENT — PAIN SCALES - GENERAL: PAINLEVEL_OUTOF10: 8

## 2024-04-05 ASSESSMENT — PAIN DESCRIPTION - FREQUENCY: FREQUENCY: INTERMITTENT

## 2024-04-05 ASSESSMENT — PAIN DESCRIPTION - DESCRIPTORS: DESCRIPTORS: ACHING

## 2024-04-05 ASSESSMENT — PAIN DESCRIPTION - ONSET: ONSET: GRADUAL

## 2024-04-05 NOTE — ED PROVIDER NOTES
EMERGENCY DEPARTMENT ENCOUNTER      Pt Name: Monique Briones  MRN: [id number]  Birthdate 1990  Date of evaluation: 2024  Provider: Nelson Moran DO    CHIEF COMPLAINT       Chief Complaint   Patient presents with    Earache     X 2 days    Ear Fullness     X 2days         HISTORY OF PRESENT ILLNESS    33-year-old female comes emergency room with hearing loss in her right ear started a few hours ago.  She is been having ear pain since yesterday, feeling like her ear is full, feel like the right side of her neck is swollen.  No fevers or chills.  He has a sore throat as well.  Recently finished antibiotics for facial bone fracture, does have an ENT doctor.  No other symptoms today.      History provided by:  Patient      Nursing Notes were reviewed.    PAST MEDICAL HISTORY     Past Medical History:   Diagnosis Date    Contact with and (suspected) exposure to covid-19 2020    Suspected COVID-19 virus infection    Contact with and (suspected) exposure to covid-19 2021    Suspected COVID-19 virus infection    Contact with and (suspected) exposure to other viral communicable diseases 2021    Contact with or exposure to viral disease    Depression, unspecified 2021    Mild depression    Gout, unspecified 2021    Gout of left foot    Other general symptoms and signs 2021    Flu-like symptoms    Personal history of other diseases of the respiratory system 2021    History of sore throat    Personal history of other diseases of the respiratory system 2021    History of acute pharyngitis    Personal history of other specified conditions 2020    History of dysuria    Urinary tract infection, site not specified 2021    Acute UTI         SURGICAL HISTORY       Past Surgical History:   Procedure Laterality Date    OTHER SURGICAL HISTORY  2021    Surgically induced          CURRENT MEDICATIONS       Previous Medications    AMPHETAMINE-DEXTROAMPHETAMINE  XR (ADDERALL XR) 20 MG 24 HR CAPSULE    Take 1 capsule (20 mg) by mouth once daily in the morning. Do not crush or chew.    GABAPENTIN (NEURONTIN) 400 MG CAPSULE    Take 1 capsule (400 mg) by mouth once daily.       ALLERGIES     Patient has no known allergies.    FAMILY HISTORY     No family history on file.       SOCIAL HISTORY       Social History     Socioeconomic History    Marital status:      Spouse name: None    Number of children: None    Years of education: None    Highest education level: None   Occupational History    None   Tobacco Use    Smoking status: Every Day     Types: Cigarettes    Smokeless tobacco: Never   Vaping Use    Vaping Use: Every day    Substances: Nicotine   Substance and Sexual Activity    Alcohol use: Yes    Drug use: Yes     Types: Marijuana    Sexual activity: Yes     Partners: Male     Birth control/protection: Condom Male   Other Topics Concern    None   Social History Narrative    None     Social Determinants of Health     Financial Resource Strain: Not on file   Food Insecurity: Not on file   Transportation Needs: Not on file   Physical Activity: Not on file   Stress: Not on file   Social Connections: Not on file   Intimate Partner Violence: Not on file   Housing Stability: Not on file       SCREENINGS                        PHYSICAL EXAM    (up to 7 for level 4, 8 or more for level 5)     ED Triage Vitals [04/05/24 0329]   Temperature Heart Rate Respirations BP   37 °C (98.6 °F) 90 19 135/83      Pulse Ox Temp Source Heart Rate Source Patient Position   100 % Temporal Monitor Sitting      BP Location FiO2 (%)     Right arm --       Physical Exam  Vitals and nursing note reviewed.   Constitutional:       General: She is not in acute distress.  HENT:      Head: Normocephalic and atraumatic.      Left Ear: Tympanic membrane, ear canal and external ear normal.      Ears:      Comments: Right TM is erythematous, external ear canal is erythematous as well.  Eyes:       General: No scleral icterus.        Right eye: No discharge.         Left eye: No discharge.      Conjunctiva/sclera: Conjunctivae normal.   Neck:      Comments: Right cervical adenopathy  Cardiovascular:      Rate and Rhythm: Normal rate and regular rhythm.      Pulses: Normal pulses.   Pulmonary:      Effort: Pulmonary effort is normal.   Abdominal:      General: Abdomen is flat.      Palpations: Abdomen is soft.      Tenderness: There is no abdominal tenderness. There is no guarding or rebound.   Musculoskeletal:         General: No deformity.      Right lower leg: No edema.      Left lower leg: No edema.   Lymphadenopathy:      Cervical: Cervical adenopathy present.   Skin:     General: Skin is warm and dry.   Neurological:      Mental Status: She is alert and oriented to person, place, and time. Mental status is at baseline.   Psychiatric:         Mood and Affect: Mood normal.         Behavior: Behavior normal.          DIAGNOSTIC RESULTS     LABS:  Labs Reviewed - No data to display    All other labs were within normal range or not returned as of this dictation.    Imaging  No orders to display        Procedures  Procedures     EMERGENCY DEPARTMENT COURSE/MDM:     ED Course as of 04/05/24 0603 Fri Apr 05, 2024 0603 This is a 33 years old female patient presented to the emergency department with a chief complaint of bilateral ear pain more on the right side.  Denies any recent swimming pool activity.  Denies any headache, lightheadedness, dizziness, fever, chills, body aches, cough, chest pain, shortness of breath, weakness or focal numbness.    Review of system: As stated above in the HPI section.    Physical exam revealed a 33 years old female patient does not appear to be in acute distress  Cardiopulmonary as well as abdominal and neurologic exam are unremarkable and as documented in the physical exam section.    Ear exam revealed bulging tympanic membrane and erythematous tympanic membrane bilaterally.   Right external auditory canal is swollen but not close chart.  Discharge the patient on Augmentin and Ciprodex.  Patient is given instruction to follow-up with the primary care doctor and to return to the ED if alarming symptoms arise as per discharge instruction. [ME]      ED Course User Index  [ME] Lincoln H Darryl          Diagnoses as of 04/05/24 0603   Right otitis media, unspecified otitis media type   Acute otitis externa of right ear, unspecified type        Medical Decision Making  33-year-old comes emergency with ear fullness, ear pain.  Does appear to have otitis on examination.  She is having neck swelling, however there is no objective swelling, this does appear to be a subjective feeling of right-sided neck swelling under her ear, I suspect this is likely secondary to adenopathy.  Patient was given Ciprodex drops, amoxicillin, discharged home and will follow-up with her ENT if she cannot hear as well in the next 1 to 2 days.  She will return for any new, turning or worsening symptoms.      I saw and evaluated the patient. I personally obtained the key and critical portions of the history and physical exam or was physically present for key and critical portions performed by the resident/fellow. I reviewed the resident/fellow's documentation and discussed the patient with the resident/fellow. I agree with the resident/fellow's medical decision making as documented in the note.      Please refer to my personal documentation in the ED course section.      Lincoln Andrews DO               Patient and or family in agreement and understanding of treatment plan.  All questions answered.      I reviewed the case with the attending ED physician. The attending ED physician agrees with the plan. Patient and/or patient´s representative was counseled regarding labs, imaging, likely diagnosis, and plan. All questions were answered.    ED Medications administered this visit:    Medications   amoxicillin-pot  clavulanate (Augmentin) 875-125 mg per tablet 1 tablet (has no administration in time range)       New Prescriptions from this visit:    New Prescriptions    AMOXICILLIN-POT CLAVULANATE (AUGMENTIN) 875-125 MG TABLET    Take 1 tablet by mouth every 12 hours for 7 days.    CIPROFLOXACIN-DEXAMETHASONE (CIPRODEX) OTIC SUSPENSION    Administer 4 drops into the right ear 2 times a day for 7 days.       Follow-up:  Sherif Driscoll MD  [address] [facility] [id number] [phone]    Schedule an appointment as soon as possible for a visit       Gunnar Felton MD  [address]  [facility], [address] [id number] [phone]              Final Impression:   1. Right otitis media, unspecified otitis media type          (Please note that portions of this note were completed with a voice recognition program.  Efforts were made to edit the dictations but occasionally words are mis-transcribed.)     Nelson Moran,   Resident  04/05/24 0350       Lincoln Andrews, DO  04/05/24 0601       Lincoln Andrews, DO  04/05/24 0601       Lincoln Andrews, DO  04/05/24 0603       Lincoln Andrews, DO  04/05/24 0605

## 2024-04-05 NOTE — DISCHARGE INSTRUCTIONS
Take antibiotics as prescribed and sent to your pharmacy.  Follow-up with your ENT doctor if you cannot hear with the next 1 to 2 days.  Return for new, worsening or worsening symptoms.

## 2024-11-07 ENCOUNTER — HOSPITAL ENCOUNTER (EMERGENCY)
Facility: HOSPITAL | Age: 34
Discharge: HOME | End: 2024-11-07
Payer: COMMERCIAL

## 2024-11-07 VITALS
OXYGEN SATURATION: 95 % | HEIGHT: 70 IN | SYSTOLIC BLOOD PRESSURE: 126 MMHG | BODY MASS INDEX: 25.77 KG/M2 | WEIGHT: 180 LBS | TEMPERATURE: 97.7 F | DIASTOLIC BLOOD PRESSURE: 82 MMHG | RESPIRATION RATE: 18 BRPM | HEART RATE: 86 BPM

## 2024-11-07 DIAGNOSIS — R35.0 URINARY FREQUENCY: Primary | ICD-10-CM

## 2024-11-07 PROBLEM — F41.9 ANXIETY: Status: ACTIVE | Noted: 2022-08-16

## 2024-11-07 PROBLEM — F31.81 BIPOLAR II DISORDER (MULTI): Chronic | Status: ACTIVE | Noted: 2022-08-17

## 2024-11-07 PROBLEM — F41.1 GENERALIZED ANXIETY DISORDER WITH PANIC ATTACKS: Status: ACTIVE | Noted: 2022-08-16

## 2024-11-07 PROBLEM — Z86.59 HISTORY OF PSYCHOSIS: Status: ACTIVE | Noted: 2024-11-07

## 2024-11-07 PROBLEM — R30.0 DYSURIA: Status: ACTIVE | Noted: 2024-11-07

## 2024-11-07 PROBLEM — F41.0 GENERALIZED ANXIETY DISORDER WITH PANIC ATTACKS: Status: ACTIVE | Noted: 2022-08-16

## 2024-11-07 PROBLEM — F31.9: Chronic | Status: ACTIVE | Noted: 2022-12-09

## 2024-11-07 PROBLEM — F31.31: Status: ACTIVE | Noted: 2024-11-07

## 2024-11-07 PROBLEM — S01.01XA SCALP LACERATION: Status: ACTIVE | Noted: 2024-11-07

## 2024-11-07 PROBLEM — F14.10 COCAINE ABUSE: Status: ACTIVE | Noted: 2024-11-07

## 2024-11-07 PROBLEM — N92.0 MENORRHAGIA: Status: ACTIVE | Noted: 2024-11-07

## 2024-11-07 PROBLEM — J01.90 ACUTE SINUSITIS: Status: ACTIVE | Noted: 2024-01-13

## 2024-11-07 PROBLEM — F90.2 ATTENTION DEFICIT HYPERACTIVITY DISORDER (ADHD), COMBINED TYPE: Status: ACTIVE | Noted: 2022-08-16

## 2024-11-07 LAB
APPEARANCE UR: ABNORMAL
BILIRUB UR STRIP.AUTO-MCNC: NEGATIVE MG/DL
COLOR UR: YELLOW
GLUCOSE UR STRIP.AUTO-MCNC: NORMAL MG/DL
HOLD SPECIMEN: NORMAL
KETONES UR STRIP.AUTO-MCNC: ABNORMAL MG/DL
LEUKOCYTE ESTERASE UR QL STRIP.AUTO: ABNORMAL
MUCOUS THREADS #/AREA URNS AUTO: NORMAL /LPF
NITRITE UR QL STRIP.AUTO: NEGATIVE
PH UR STRIP.AUTO: 6 [PH]
PROT UR STRIP.AUTO-MCNC: NEGATIVE MG/DL
RBC # UR STRIP.AUTO: NEGATIVE /UL
RBC #/AREA URNS AUTO: NORMAL /HPF
SP GR UR STRIP.AUTO: 1.02
SQUAMOUS #/AREA URNS AUTO: NORMAL /HPF
UROBILINOGEN UR STRIP.AUTO-MCNC: NORMAL MG/DL
WBC #/AREA URNS AUTO: NORMAL /HPF

## 2024-11-07 PROCEDURE — 99283 EMERGENCY DEPT VISIT LOW MDM: CPT

## 2024-11-07 PROCEDURE — 81001 URINALYSIS AUTO W/SCOPE: CPT | Performed by: NURSE PRACTITIONER

## 2024-11-07 PROCEDURE — 87086 URINE CULTURE/COLONY COUNT: CPT | Mod: ELYLAB | Performed by: NURSE PRACTITIONER

## 2024-11-07 ASSESSMENT — PAIN DESCRIPTION - PROGRESSION: CLINICAL_PROGRESSION: NOT CHANGED

## 2024-11-07 ASSESSMENT — PAIN DESCRIPTION - DESCRIPTORS: DESCRIPTORS: CRAMPING

## 2024-11-07 ASSESSMENT — PAIN DESCRIPTION - LOCATION: LOCATION: ABDOMEN

## 2024-11-07 ASSESSMENT — PAIN - FUNCTIONAL ASSESSMENT: PAIN_FUNCTIONAL_ASSESSMENT: 0-10

## 2024-11-07 ASSESSMENT — PAIN SCALES - GENERAL: PAINLEVEL_OUTOF10: 7

## 2024-11-07 ASSESSMENT — PAIN DESCRIPTION - PAIN TYPE: TYPE: ACUTE PAIN

## 2024-11-07 ASSESSMENT — COLUMBIA-SUICIDE SEVERITY RATING SCALE - C-SSRS
2. HAVE YOU ACTUALLY HAD ANY THOUGHTS OF KILLING YOURSELF?: NO
6. HAVE YOU EVER DONE ANYTHING, STARTED TO DO ANYTHING, OR PREPARED TO DO ANYTHING TO END YOUR LIFE?: NO
1. IN THE PAST MONTH, HAVE YOU WISHED YOU WERE DEAD OR WISHED YOU COULD GO TO SLEEP AND NOT WAKE UP?: NO

## 2024-11-07 NOTE — ED PROVIDER NOTES
HPI   Chief Complaint   Patient presents with    Abdominal Pain     Pt states she is having a panic attack cause she thinks she has parasites and has been having diarrhea and bloating.        Patient is a healthy nontoxic-appearing 34-year-old female with past medical history of left maxillary fracture, sinusitis, anxiety, ADHD, bipolar disorder, cocaine abuse, dysuria, panic attack, presents the emergency room today for complaint of abdominal pain.  Patient states she is been having diarrhea and bloating over the past 2 weeks.  Patient states she has had 3 episodes of diarrhea yesterday.  Patient states she believes she was experiencing IBS and has an appointment to see GI specialist later this week however read online she may have parasites and wanted make sure this was not the case.  Patient denies any recent travel to other countries, dietary changes, nausea or vomiting.  Patient denies any chest pain, shortness of breath difficulty breathing, fever, shin, or chills.  Patient does complain of increased urinary frequency however denies any burning with urination, blood in the urine, back pain.              Patient History   Past Medical History:   Diagnosis Date    Bipolar 1 disorder (Multi)     Contact with and (suspected) exposure to covid-19 12/16/2020    Suspected COVID-19 virus infection    Contact with and (suspected) exposure to covid-19 01/14/2021    Suspected COVID-19 virus infection    Contact with and (suspected) exposure to other viral communicable diseases 01/07/2021    Contact with or exposure to viral disease    Depression, unspecified 07/06/2021    Mild depression    Gout, unspecified 04/24/2021    Gout of left foot    Other general symptoms and signs 01/14/2021    Flu-like symptoms    Personal history of other diseases of the respiratory system 02/24/2021    History of sore throat    Personal history of other diseases of the respiratory system 02/24/2021    History of acute pharyngitis     Personal history of other specified conditions 2020    History of dysuria    Urinary tract infection, site not specified 2021    Acute UTI     Past Surgical History:   Procedure Laterality Date    OTHER SURGICAL HISTORY  2021    Surgically induced      No family history on file.  Social History     Tobacco Use    Smoking status: Every Day     Current packs/day: 0.50     Types: Cigarettes    Smokeless tobacco: Never   Vaping Use    Vaping status: Every Day    Substances: Nicotine   Substance Use Topics    Alcohol use: Yes    Drug use: Yes     Types: Marijuana       Physical Exam   ED Triage Vitals [24 0420]   Temperature Heart Rate Respirations BP   36.5 °C (97.7 °F) (!) 155 (!) 24 (!) 138/94      Pulse Ox Temp Source Heart Rate Source Patient Position   95 % Temporal Monitor Sitting      BP Location FiO2 (%)     Right arm --       Physical Exam  Vitals and nursing note reviewed. Exam conducted with a chaperone present.   Constitutional:       General: She is not in acute distress.     Appearance: Normal appearance. She is not ill-appearing, toxic-appearing or diaphoretic.   HENT:      Head: Normocephalic.      Nose: Nose normal.      Mouth/Throat:      Mouth: Mucous membranes are moist.   Eyes:      Extraocular Movements: Extraocular movements intact.      Pupils: Pupils are equal, round, and reactive to light.   Cardiovascular:      Rate and Rhythm: Normal rate and regular rhythm.      Pulses: Normal pulses.      Heart sounds: Normal heart sounds. No murmur heard.     No friction rub. No gallop.   Pulmonary:      Effort: Pulmonary effort is normal. No respiratory distress.      Breath sounds: Normal breath sounds. No stridor. No wheezing, rhonchi or rales.   Chest:      Chest wall: No tenderness.   Abdominal:      General: Abdomen is flat. There is no distension.      Palpations: Abdomen is soft. There is no mass.      Tenderness: There is no abdominal tenderness. There is no right  CVA tenderness, left CVA tenderness, guarding or rebound.      Hernia: No hernia is present.   Musculoskeletal:         General: Normal range of motion.      Cervical back: Normal range of motion and neck supple.   Skin:     General: Skin is warm and dry.      Capillary Refill: Capillary refill takes less than 2 seconds.      Coloration: Skin is not jaundiced or pale.      Findings: No bruising, erythema, lesion or rash.   Neurological:      Mental Status: She is alert.           ED Course & MDM   Diagnoses as of 11/07/24 0554   Urinary frequency                 No data recorded     Esther Coma Scale Score: 15 (11/07/24 0420 : Loreta Morse, YELENA)                           Medical Decision Making  Given patient's complaint presentation a thorough exam was performed.  Patient has no reproducible abdominal tenderness upon palpation, denies any recent travel to other countries, dietary changes or eating of raw or undercooked food, has no CVA tenderness upon palpation, no adventitious lung sounds auscultated, speaking complete sentences no respiratory distress, cardiac sounds auscultated are regular, have a low suspicion of acute abdomen, pyelonephritis, renal calculi, GI parasites.  Patient is complaining increased urinary frequency and urinalysis was ordered today.  Urinalysis revealed several irregularities without any acute findings or concern for acute cystitis.  Patient states she has a concern for IBS and has appointment see GI specialist later this week and I strongly encouraged going this appointment.  Given patient has no abdominal pain or signs of dehydration the emergency room I do not believe any further imaging or lab work is warranted at this time.  I encouraged increasing hydration and monitoring symptoms, if they become worse return to emergency room for further evaluation.  Patient was agreeable this plan discharged home in stable condition.    DOMINIC Odell-CNP     Portions of this note were  generated using digital voice recognition software, and may contain grammatical errors      Procedure  Procedures     Angel Plasencia, DOMINIC-CNP  11/07/24 0581

## 2024-11-08 LAB — BACTERIA UR CULT: NORMAL

## 2025-03-05 ENCOUNTER — HOSPITAL ENCOUNTER (EMERGENCY)
Age: 35
Discharge: HOME | End: 2025-03-05
Payer: COMMERCIAL

## 2025-03-05 VITALS
SYSTOLIC BLOOD PRESSURE: 148 MMHG | DIASTOLIC BLOOD PRESSURE: 86 MMHG | HEIGHT: 70 IN | BODY MASS INDEX: 20.92 KG/M2 | RESPIRATION RATE: 18 BRPM | OXYGEN SATURATION: 100 %

## 2025-03-05 PROCEDURE — 99283 EMERGENCY DEPT VISIT LOW MDM: CPT

## 2025-03-05 PROCEDURE — 93005 ELECTROCARDIOGRAM TRACING: CPT

## 2025-03-05 RX ORDER — GABAPENTIN 300 MG/1
300 CAPSULE ORAL 2 TIMES DAILY
COMMUNITY

## 2025-03-05 ASSESSMENT — PAIN - FUNCTIONAL ASSESSMENT: PAIN_FUNCTIONAL_ASSESSMENT: NONE - DENIES PAIN

## 2025-03-05 NOTE — ED TRIAGE NOTES
Arrived to ED about a half hour ago after friends cat almost got out   Pt has rapid breathing and is educated on slow even breathes at this time   Pt is unable to sit still   Pt arrived with friend at this time   Pt has history of panic attacks  Pt states that hand are \"locking up\"

## 2025-03-05 NOTE — ED PROVIDER NOTES
Basic Information   Time Seen: 4:43 PM   Primary Care Provider: No primary care provider on file.     Chief Complaint   Patient presents with    Panic Attack     Took 2 gabapentin         HPI   Sandra Ley is a 34 yrs female who presents with panic attack.  Patient states this feels like her previous panic attacks worse.  Patient took 2 gabapentin.  Patient is complaining of chest pain and states her hands are locking up.   Physical Exam     BP (!) 148/86 (03/05/25 1642)    Temp      Pulse     Resp 18 (03/05/25 1642)    SpO2 100 % (03/05/25 1642)       General: Awake and Alert, anxious and restless   CV: Tachycardia, S1, S2   Resp: LCTAB, even and non labored   Other:   Impression and Plan   Labs Reviewed - No data to display     No orders to display      Final Impression   I have performed a medical screening exam on Sandra Ley. Based on this patient's chief complaint/symptoms of   Chief Complaint   Patient presents with    Panic Attack     Took 2 gabapentin       and my focused exam, their care will be started and transitioned to provider when room is available       Lroena Bella PA-C  03/05/25 0770

## 2025-03-06 LAB
EKG ATRIAL RATE: 149 BPM
EKG P AXIS: 62 DEGREES
EKG P-R INTERVAL: 126 MS
EKG Q-T INTERVAL: 348 MS
EKG QRS DURATION: 74 MS
EKG QTC CALCULATION (BAZETT): 548 MS
EKG R AXIS: 66 DEGREES
EKG T AXIS: 60 DEGREES
EKG VENTRICULAR RATE: 149 BPM

## 2025-03-06 PROCEDURE — 93010 ELECTROCARDIOGRAM REPORT: CPT | Performed by: INTERNAL MEDICINE

## 2025-05-22 ENCOUNTER — HOSPITAL ENCOUNTER (EMERGENCY)
Facility: HOSPITAL | Age: 35
Discharge: HOME | End: 2025-05-22
Payer: COMMERCIAL

## 2025-05-22 VITALS
DIASTOLIC BLOOD PRESSURE: 77 MMHG | OXYGEN SATURATION: 100 % | HEART RATE: 85 BPM | BODY MASS INDEX: 25.77 KG/M2 | SYSTOLIC BLOOD PRESSURE: 149 MMHG | TEMPERATURE: 97.5 F | HEIGHT: 70 IN | WEIGHT: 180 LBS | RESPIRATION RATE: 20 BRPM

## 2025-05-22 DIAGNOSIS — K03.81 CRACKED TOOTH: Primary | ICD-10-CM

## 2025-05-22 PROCEDURE — 96372 THER/PROPH/DIAG INJ SC/IM: CPT | Performed by: PHYSICIAN ASSISTANT

## 2025-05-22 PROCEDURE — 99284 EMERGENCY DEPT VISIT MOD MDM: CPT

## 2025-05-22 PROCEDURE — 2500000004 HC RX 250 GENERAL PHARMACY W/ HCPCS (ALT 636 FOR OP/ED): Mod: JZ | Performed by: PHYSICIAN ASSISTANT

## 2025-05-22 RX ORDER — AMOXICILLIN AND CLAVULANATE POTASSIUM 875; 125 MG/1; MG/1
1 TABLET, FILM COATED ORAL EVERY 12 HOURS
Qty: 20 TABLET | Refills: 0 | Status: SHIPPED | OUTPATIENT
Start: 2025-05-22 | End: 2025-06-01

## 2025-05-22 RX ORDER — AMOXICILLIN AND CLAVULANATE POTASSIUM 875; 125 MG/1; MG/1
1 TABLET, FILM COATED ORAL EVERY 12 HOURS
Qty: 20 TABLET | Refills: 0 | Status: SHIPPED | OUTPATIENT
Start: 2025-05-22 | End: 2025-05-22

## 2025-05-22 RX ORDER — DEXAMETHASONE SODIUM PHOSPHATE 10 MG/ML
10 INJECTION INTRAMUSCULAR; INTRAVENOUS ONCE
Status: COMPLETED | OUTPATIENT
Start: 2025-05-22 | End: 2025-05-22

## 2025-05-22 RX ADMIN — DEXAMETHASONE SODIUM PHOSPHATE 10 MG: 10 INJECTION, SOLUTION INTRAMUSCULAR; INTRAVENOUS at 15:27

## 2025-05-22 ASSESSMENT — COLUMBIA-SUICIDE SEVERITY RATING SCALE - C-SSRS
2. HAVE YOU ACTUALLY HAD ANY THOUGHTS OF KILLING YOURSELF?: NO
1. IN THE PAST MONTH, HAVE YOU WISHED YOU WERE DEAD OR WISHED YOU COULD GO TO SLEEP AND NOT WAKE UP?: NO
6. HAVE YOU EVER DONE ANYTHING, STARTED TO DO ANYTHING, OR PREPARED TO DO ANYTHING TO END YOUR LIFE?: NO

## 2025-05-22 ASSESSMENT — PAIN DESCRIPTION - DESCRIPTORS: DESCRIPTORS: ACHING

## 2025-05-22 ASSESSMENT — PAIN - FUNCTIONAL ASSESSMENT: PAIN_FUNCTIONAL_ASSESSMENT: 0-10

## 2025-05-22 ASSESSMENT — LIFESTYLE VARIABLES
EVER HAD A DRINK FIRST THING IN THE MORNING TO STEADY YOUR NERVES TO GET RID OF A HANGOVER: NO
HAVE PEOPLE ANNOYED YOU BY CRITICIZING YOUR DRINKING: NO
EVER FELT BAD OR GUILTY ABOUT YOUR DRINKING: NO
TOTAL SCORE: 0
HAVE YOU EVER FELT YOU SHOULD CUT DOWN ON YOUR DRINKING: NO

## 2025-05-22 ASSESSMENT — PAIN DESCRIPTION - ORIENTATION: ORIENTATION: RIGHT;LOWER

## 2025-05-22 ASSESSMENT — PAIN DESCRIPTION - ONSET: ONSET: SUDDEN

## 2025-05-22 ASSESSMENT — PAIN DESCRIPTION - FREQUENCY: FREQUENCY: CONSTANT/CONTINUOUS

## 2025-05-22 ASSESSMENT — PAIN SCALES - GENERAL: PAINLEVEL_OUTOF10: 10 - WORST POSSIBLE PAIN

## 2025-05-22 ASSESSMENT — PAIN DESCRIPTION - PROGRESSION: CLINICAL_PROGRESSION: NOT CHANGED

## 2025-05-22 ASSESSMENT — PAIN DESCRIPTION - LOCATION: LOCATION: TEETH

## 2025-05-22 ASSESSMENT — PAIN DESCRIPTION - PAIN TYPE: TYPE: ACUTE PAIN

## 2025-05-22 NOTE — ED PROVIDER NOTES
HPI   Chief Complaint   Patient presents with    Dental Pain     Right lower dental pain       This is a 34-year-old female right lower dental pain from a tooth that is broken that has been broken in the past and she has follow-up with dentistry on Tuesday. Denies throat pain, difficulty swallowing, difficulty breathing, fevers, chills, chest pain, SOB, abdominal pain, nausea, vomiting, diarrhea.      History provided by:  Patient   used: No            Patient History   Medical History[1]  Surgical History[2]  Family History[3]  Social History[4]    Physical Exam   ED Triage Vitals [05/22/25 1457]   Temperature Heart Rate Respirations BP   36.4 °C (97.5 °F) 85 20 149/77      Pulse Ox Temp Source Heart Rate Source Patient Position   100 % Temporal Monitor Sitting      BP Location FiO2 (%)     Right arm --       Physical Exam    Gen.: Vitals noted no distress afebrile. Normal phonation, no stridor, no trismus.  ENT: Poor dentition. Floor of the mouth is soft.  Cracked tooth #29.  No obvious abscess. Posterior oropharynx patent, noninjected, no exudate. Uvula midline.  Neck: No lymphadenopathy.   Cardiac: Regular rate and rhythm. No murmur.  Lungs: Good aeration throughout. No adventitious breath sounds.     ED Course & MDM   Diagnoses as of 05/22/25 1602   Cracked tooth                 No data recorded     Esther Coma Scale Score: 15 (05/22/25 1500 : Norah Petersen RN)                           Medical Decision Making  DDx: odontalgia, gingival abscess, periapical abscess, cracked tooth    Shot of Decadron given here will place on Augmentin advise follow-up with dentistry.  Instructed to return to the nearest ED if any concerns or new or worsening symptoms. Patient verbalized understanding and agreement with plan. Discharged in stable condition.      Disclaimer: This note was dictated using speech recognition software. An attempt at proofreading was made to minimize errors. Minor errors in  transcription may be present.        Procedure  Procedures       [1]   Past Medical History:  Diagnosis Date    Bipolar 1 disorder (Multi)     Contact with and (suspected) exposure to covid-19 2020    Suspected COVID-19 virus infection    Contact with and (suspected) exposure to covid-19 2021    Suspected COVID-19 virus infection    Contact with and (suspected) exposure to other viral communicable diseases 2021    Contact with or exposure to viral disease    Depression, unspecified 2021    Mild depression    Gout, unspecified 2021    Gout of left foot    Other general symptoms and signs 2021    Flu-like symptoms    Personal history of other diseases of the respiratory system 2021    History of sore throat    Personal history of other diseases of the respiratory system 2021    History of acute pharyngitis    Personal history of other specified conditions 2020    History of dysuria    Urinary tract infection, site not specified 2021    Acute UTI   [2]   Past Surgical History:  Procedure Laterality Date    OTHER SURGICAL HISTORY  2021    Surgically induced    [3] No family history on file.  [4]   Social History  Tobacco Use    Smoking status: Every Day     Current packs/day: 0.50     Types: Cigarettes    Smokeless tobacco: Never   Vaping Use    Vaping status: Every Day    Substances: Nicotine   Substance Use Topics    Alcohol use: Yes    Drug use: Yes     Types: Marijuana        Roque Marti PA-C  [phone]

## 2025-07-31 ENCOUNTER — HOSPITAL ENCOUNTER (EMERGENCY)
Facility: HOSPITAL | Age: 35
Discharge: HOME | End: 2025-07-31
Attending: EMERGENCY MEDICINE
Payer: COMMERCIAL

## 2025-07-31 VITALS
SYSTOLIC BLOOD PRESSURE: 141 MMHG | OXYGEN SATURATION: 96 % | BODY MASS INDEX: 25.34 KG/M2 | DIASTOLIC BLOOD PRESSURE: 85 MMHG | TEMPERATURE: 97.9 F | RESPIRATION RATE: 16 BRPM | WEIGHT: 177 LBS | HEIGHT: 70 IN | HEART RATE: 90 BPM

## 2025-07-31 DIAGNOSIS — K08.89 PAIN, DENTAL: Primary | ICD-10-CM

## 2025-07-31 PROCEDURE — 2500000004 HC RX 250 GENERAL PHARMACY W/ HCPCS (ALT 636 FOR OP/ED): Mod: JZ | Performed by: EMERGENCY MEDICINE

## 2025-07-31 PROCEDURE — 96372 THER/PROPH/DIAG INJ SC/IM: CPT | Performed by: EMERGENCY MEDICINE

## 2025-07-31 PROCEDURE — 99284 EMERGENCY DEPT VISIT MOD MDM: CPT | Performed by: EMERGENCY MEDICINE

## 2025-07-31 PROCEDURE — 2500000001 HC RX 250 WO HCPCS SELF ADMINISTERED DRUGS (ALT 637 FOR MEDICARE OP): Performed by: EMERGENCY MEDICINE

## 2025-07-31 RX ORDER — KETOROLAC TROMETHAMINE 30 MG/ML
30 INJECTION, SOLUTION INTRAMUSCULAR; INTRAVENOUS ONCE
Status: COMPLETED | OUTPATIENT
Start: 2025-07-31 | End: 2025-07-31

## 2025-07-31 RX ORDER — AMOXICILLIN AND CLAVULANATE POTASSIUM 875; 125 MG/1; MG/1
1 TABLET, FILM COATED ORAL ONCE
Status: COMPLETED | OUTPATIENT
Start: 2025-07-31 | End: 2025-07-31

## 2025-07-31 RX ORDER — IBUPROFEN 600 MG/1
600 TABLET, FILM COATED ORAL EVERY 6 HOURS PRN
Qty: 28 TABLET | Refills: 0 | Status: SHIPPED | OUTPATIENT
Start: 2025-07-31 | End: 2025-08-01

## 2025-07-31 RX ADMIN — AMOXICILLIN AND CLAVULANATE POTASSIUM 1 TABLET: 875; 125 TABLET, FILM COATED ORAL at 16:40

## 2025-07-31 RX ADMIN — KETOROLAC TROMETHAMINE 30 MG: 30 INJECTION, SOLUTION INTRAMUSCULAR at 16:40

## 2025-07-31 ASSESSMENT — PAIN SCALES - GENERAL: PAINLEVEL_OUTOF10: 9

## 2025-07-31 ASSESSMENT — PAIN - FUNCTIONAL ASSESSMENT: PAIN_FUNCTIONAL_ASSESSMENT: 0-10

## 2025-07-31 NOTE — DISCHARGE INSTRUCTIONS
Please take the Augmentin as prescribed treat for infection.  Please use Motrin 600 mg as prescribed help with dental pain.  Please turn ED for worsening chest pain, dental pain, fever, facial swelling or other concerning symptoms.

## 2025-07-31 NOTE — ED PROVIDER NOTES
Emergency Department Provider Note       History of Present Illness     History provided by: Patient  Limitations to History: None  External Records Reviewed with Brief Summary: None    HPI:  Monique Briones is a 34 y.o. female with past medical history of ADHD, bipolar, cocaine abuse, dysuria, panic attack does present with complaint of right sided dental pain.  In the right lower molar.  Has been seen previously for similar tooth.  No fever or chills.  As of swelling noted to the gums but no clear abscess.  Some lymph node swelling below.  No floor mouth swelling.  No airway involvement.  No difficulty breathing.    Physical Exam   Triage vitals:  T 36.6 °C (97.9 °F)  HR 90  /85  RR 16  O2 96 % None (Room air)    General: Awake, alert, in no acute distress  Eyes: Gaze conjugate.  No scleral icterus or injection  HENT: Normo-cephalic, atraumatic. No stridor right posterior molar does have a cavity with associated gum swelling.  No clear abscess.  Positive cavity.  CV: Regular rate, regular rhythm. Radial pulses 2+ bilaterally  Resp: Breathing non-labored, speaking in full sentences.  Clear to auscultation bilaterally  GI: Soft, non-distended, non-tender. No rebound or guarding.  Right  MSK/Extremities: No gross bony deformities. Moving all extremities  Skin: Warm. Appropriate color  Neuro: Alert. Oriented. Face symmetric. Speech is fluent.  Gross strength and sensation intact in b/l UE and LEs  Psych: Appropriate mood and affect      Medical Decision Making & ED Course   Medical Decision Makin y.o. female patient here with infected cavity.  No clear abscess.  Treated with Toradol IM as well as Augmentin and Motrin to follow-up with dental clinic as outpatient.  Return precautions are discussed.  ----      Differential diagnoses considered include but are not limited to: abscess    Social Determinants of Health which Significantly Impact Care: Social Determinants of Health which Significantly  Impact Care: None identified     EKG Independent Interpretation: EKG not obtained    Independent Result Review and Interpretation: Relevant laboratory and radiographic results were reviewed and independently interpreted by myself.  As necessary, they are commented on in the ED Course.    Chronic conditions affecting the patient's care: As documented above in MDM    The patient was discussed with the following consultants/services: None    Care Considerations: As documented above in [facility]    ED Course:  Diagnoses as of 07/31/25 1645   Pain, dental       Disposition   As a result of the work-up, the patient was discharged home.  she was informed of her diagnosis and instructed to come back with any concerns or worsening of condition.  she and was agreeable to the plan as discussed above.  she was given the opportunity to ask questions.  All of the patient's questions were answered.    Procedures   Procedures        Kyle Aviles MD  Emergency Medicine                                                       Kyle Aviles MD  07/31/25 1635       Kyle Aviles MD  07/31/25 1644

## 2025-08-01 RX ORDER — AMOXICILLIN AND CLAVULANATE POTASSIUM 875; 125 MG/1; MG/1
1 TABLET, FILM COATED ORAL EVERY 12 HOURS
Qty: 14 TABLET | Refills: 0 | Status: SHIPPED | OUTPATIENT
Start: 2025-08-01 | End: 2025-08-08

## 2025-08-01 RX ORDER — AMOXICILLIN AND CLAVULANATE POTASSIUM 875; 125 MG/1; MG/1
1 TABLET, FILM COATED ORAL EVERY 12 HOURS
Qty: 14 TABLET | Refills: 0 | Status: SHIPPED | OUTPATIENT
Start: 2025-08-01 | End: 2025-08-01

## 2025-08-01 RX ORDER — IBUPROFEN 600 MG/1
600 TABLET, FILM COATED ORAL EVERY 6 HOURS PRN
Qty: 28 TABLET | Refills: 0 | Status: SHIPPED | OUTPATIENT
Start: 2025-08-01 | End: 2025-08-08